# Patient Record
Sex: FEMALE | Race: WHITE | Employment: OTHER | URBAN - METROPOLITAN AREA
[De-identification: names, ages, dates, MRNs, and addresses within clinical notes are randomized per-mention and may not be internally consistent; named-entity substitution may affect disease eponyms.]

---

## 2017-02-04 ENCOUNTER — APPOINTMENT (OUTPATIENT)
Dept: GENERAL RADIOLOGY | Age: 82
DRG: 305 | End: 2017-02-04
Attending: EMERGENCY MEDICINE
Payer: MEDICARE

## 2017-02-04 ENCOUNTER — APPOINTMENT (OUTPATIENT)
Dept: CT IMAGING | Age: 82
DRG: 305 | End: 2017-02-04
Attending: EMERGENCY MEDICINE
Payer: MEDICARE

## 2017-02-04 ENCOUNTER — HOSPITAL ENCOUNTER (INPATIENT)
Age: 82
LOS: 1 days | Discharge: HOME | DRG: 305 | End: 2017-02-05
Attending: EMERGENCY MEDICINE | Admitting: FAMILY MEDICINE
Payer: MEDICARE

## 2017-02-04 DIAGNOSIS — R42 DIZZINESS: ICD-10-CM

## 2017-02-04 DIAGNOSIS — I16.0 HYPERTENSIVE URGENCY: Primary | ICD-10-CM

## 2017-02-04 LAB
ALBUMIN SERPL BCP-MCNC: 3.9 G/DL (ref 3.2–4.6)
ALBUMIN/GLOB SERPL: 1.1 {RATIO} (ref 1.2–3.5)
ALP SERPL-CCNC: 74 U/L (ref 50–136)
ALT SERPL-CCNC: 25 U/L (ref 12–65)
ANION GAP BLD CALC-SCNC: 8 MMOL/L (ref 7–16)
AST SERPL W P-5'-P-CCNC: 23 U/L (ref 15–37)
BASOPHILS # BLD AUTO: 0 K/UL (ref 0–0.2)
BASOPHILS # BLD: 0 % (ref 0–2)
BILIRUB SERPL-MCNC: 0.2 MG/DL (ref 0.2–1.1)
BUN SERPL-MCNC: 15 MG/DL (ref 8–23)
CALCIUM SERPL-MCNC: 7.7 MG/DL (ref 8.3–10.4)
CHLORIDE SERPL-SCNC: 100 MMOL/L (ref 98–107)
CO2 SERPL-SCNC: 27 MMOL/L (ref 21–32)
CREAT SERPL-MCNC: 0.81 MG/DL (ref 0.6–1)
DIFFERENTIAL METHOD BLD: ABNORMAL
EOSINOPHIL # BLD: 0.1 K/UL (ref 0–0.8)
EOSINOPHIL NFR BLD: 1 % (ref 0.5–7.8)
ERYTHROCYTE [DISTWIDTH] IN BLOOD BY AUTOMATED COUNT: 14 % (ref 11.9–14.6)
GLOBULIN SER CALC-MCNC: 3.7 G/DL (ref 2.3–3.5)
GLUCOSE SERPL-MCNC: 133 MG/DL (ref 65–100)
HCT VFR BLD AUTO: 36 % (ref 35.8–46.3)
HGB BLD-MCNC: 12.3 G/DL (ref 11.7–15.4)
IMM GRANULOCYTES # BLD: 0 K/UL (ref 0–0.5)
IMM GRANULOCYTES NFR BLD AUTO: 0.1 % (ref 0–5)
LYMPHOCYTES # BLD AUTO: 25 % (ref 13–44)
LYMPHOCYTES # BLD: 1.9 K/UL (ref 0.5–4.6)
MCH RBC QN AUTO: 28.7 PG (ref 26.1–32.9)
MCHC RBC AUTO-ENTMCNC: 34.2 G/DL (ref 31.4–35)
MCV RBC AUTO: 83.9 FL (ref 79.6–97.8)
MONOCYTES # BLD: 0.8 K/UL (ref 0.1–1.3)
MONOCYTES NFR BLD AUTO: 11 % (ref 4–12)
NEUTS SEG # BLD: 4.9 K/UL (ref 1.7–8.2)
NEUTS SEG NFR BLD AUTO: 63 % (ref 43–78)
PLATELET # BLD AUTO: 269 K/UL (ref 150–450)
PMV BLD AUTO: 8.8 FL (ref 10.8–14.1)
POTASSIUM SERPL-SCNC: 4 MMOL/L (ref 3.5–5.1)
PROT SERPL-MCNC: 7.6 G/DL (ref 6.3–8.2)
RBC # BLD AUTO: 4.29 M/UL (ref 4.05–5.25)
SODIUM SERPL-SCNC: 135 MMOL/L (ref 136–145)
TROPONIN I SERPL-MCNC: 0.02 NG/ML (ref 0.02–0.05)
WBC # BLD AUTO: 7.8 K/UL (ref 4.3–11.1)

## 2017-02-04 PROCEDURE — 93005 ELECTROCARDIOGRAM TRACING: CPT | Performed by: EMERGENCY MEDICINE

## 2017-02-04 PROCEDURE — 96374 THER/PROPH/DIAG INJ IV PUSH: CPT | Performed by: EMERGENCY MEDICINE

## 2017-02-04 PROCEDURE — 84484 ASSAY OF TROPONIN QUANT: CPT | Performed by: EMERGENCY MEDICINE

## 2017-02-04 PROCEDURE — 99285 EMERGENCY DEPT VISIT HI MDM: CPT | Performed by: EMERGENCY MEDICINE

## 2017-02-04 PROCEDURE — 74011250636 HC RX REV CODE- 250/636: Performed by: EMERGENCY MEDICINE

## 2017-02-04 PROCEDURE — 70450 CT HEAD/BRAIN W/O DYE: CPT

## 2017-02-04 PROCEDURE — 96376 TX/PRO/DX INJ SAME DRUG ADON: CPT | Performed by: EMERGENCY MEDICINE

## 2017-02-04 PROCEDURE — 85025 COMPLETE CBC W/AUTO DIFF WBC: CPT | Performed by: EMERGENCY MEDICINE

## 2017-02-04 PROCEDURE — 71020 XR CHEST PA LAT: CPT

## 2017-02-04 PROCEDURE — 80053 COMPREHEN METABOLIC PANEL: CPT | Performed by: EMERGENCY MEDICINE

## 2017-02-04 RX ORDER — HYDRALAZINE HYDROCHLORIDE 20 MG/ML
20 INJECTION INTRAMUSCULAR; INTRAVENOUS
Status: DISCONTINUED | OUTPATIENT
Start: 2017-02-04 | End: 2017-02-04

## 2017-02-04 RX ORDER — SODIUM CHLORIDE 0.9 % (FLUSH) 0.9 %
5-10 SYRINGE (ML) INJECTION EVERY 8 HOURS
Status: DISCONTINUED | OUTPATIENT
Start: 2017-02-04 | End: 2017-02-05 | Stop reason: HOSPADM

## 2017-02-04 RX ORDER — SODIUM CHLORIDE 0.9 % (FLUSH) 0.9 %
5-10 SYRINGE (ML) INJECTION AS NEEDED
Status: DISCONTINUED | OUTPATIENT
Start: 2017-02-04 | End: 2017-02-05 | Stop reason: HOSPADM

## 2017-02-04 RX ORDER — HYDRALAZINE HYDROCHLORIDE 20 MG/ML
10 INJECTION INTRAMUSCULAR; INTRAVENOUS
Status: COMPLETED | OUTPATIENT
Start: 2017-02-04 | End: 2017-02-04

## 2017-02-04 RX ADMIN — HYDRALAZINE HYDROCHLORIDE 10 MG: 20 INJECTION INTRAMUSCULAR; INTRAVENOUS at 23:29

## 2017-02-04 RX ADMIN — HYDRALAZINE HYDROCHLORIDE 10 MG: 20 INJECTION INTRAMUSCULAR; INTRAVENOUS at 21:56

## 2017-02-04 NOTE — IP AVS SNAPSHOT
Current Discharge Medication List  
  
Take these medications at their scheduled times Dose & Instructions Dispensing Information Comments Morning Noon Evening Bedtime ASPIR-81 81 mg tablet Generic drug:  aspirin delayed-release Your next dose is: Today, Tomorrow Other:  ____________ Dose:  81 mg Take 81 mg by mouth every morning. Refills:  0  
     
   
   
   
  
 chromium 200 mcg Cap Your next dose is: Today, Tomorrow Other:  ____________ Dose:  1 Tab Take 1 Tab by mouth every morning. Indications: taking PRN Refills:  0  
     
   
   
   
  
 COQ10  100-100 mg-unit Cap Generic drug:  coenzyme q10-vitamin e Your next dose is: Today, Tomorrow Other:  ____________ Dose:  1 Tab Take 1 Tab by mouth every morning. Indications: Taking 200mg Refills:  0  
     
   
   
   
  
 FISH OIL 1,200-144-216 mg Cap Generic drug:  fish oil-dha-epa Your next dose is: Today, Tomorrow Other:  ____________ Dose:  1 Tab Take 1 Tab by mouth every morning. Refills:  0  
     
   
   
   
  
 metoprolol succinate 50 mg XL tablet Commonly known as:  TOPROL XL Your next dose is: Today, Tomorrow Other:  ____________ Dose:  50 mg Take 1 Tab by mouth every morning. Indications: HYPERTENSION Quantity:  90 Tab Refills:  1 MULTI-VITAMIN PO Your next dose is: Today, Tomorrow Other:  ____________ Dose:  1 Tab Take 1 Tab by mouth every morning. Refills:  0  
     
   
   
   
  
 pravastatin 40 mg tablet Commonly known as:  PRAVACHOL Your next dose is: Today, Tomorrow Other:  ____________ Dose:  40 mg Take 1 Tab by mouth nightly. Quantity:  90 Tab Refills:  1  
     
   
   
   
  
 sertraline 50 mg tablet Commonly known as:  ZOLOFT Your next dose is: Today, Tomorrow Other:  ____________ Dose:  50 mg Take 1 Tab by mouth daily. Indications: ANXIETY WITH DEPRESSION Quantity:  90 Tab Refills:  1 VITAMIN D3 1,000 unit Cap Generic drug:  cholecalciferol Your next dose is: Today, Tomorrow Other:  ____________ Dose:  1000 Units Take 1,000 Units by mouth two (2) times a day. Refills:  0 Take these medications as needed Dose & Instructions Dispensing Information Comments Morning Noon Evening Bedtime IMODIUM A-D PO Your next dose is: Today, Tomorrow Other:  ____________ Dose:  0.5 Tab Take 0.5 Tabs by mouth as needed. Refills:  0  
     
   
   
   
  
 omeprazole 20 mg capsule Commonly known as:  PriLOSEC Your next dose is: Today, Tomorrow Other:  ____________ Dose:  20 mg Take 1 Cap by mouth daily as needed. Indications: HEARTBURN Quantity:  90 Cap Refills:  1 Take these medications as directed Dose & Instructions Dispensing Information Comments Morning Noon Evening Bedtime  
 losartan 50 mg tablet Commonly known as:  COZAAR Your next dose is: Today, Tomorrow Other:  ____________ TAKE 1 TABLET EVERY DAY FOR HYPERTENSION (CHANGE IN DOSE). Quantity:  90 Tab Refills:  3

## 2017-02-04 NOTE — IP AVS SNAPSHOT
Raven Soto 
 
 
 [address] 
[phone] Patient: Fatmata Lockhart MRN: [id number] WHY:[birth year] You are allergic to the following Allergen Reactions Claritin-D 12 Hour (Loratadine-Pseudoephedrine) Other (comments) Heartburn per pt Codeine Nausea and Vomiting Light headed and faint Erythromycin Diarrhea Nausea and vomiting Lipitor (Atorvastatin) Unknown (comments) Immunizations Administered for This Admission Name Date  
 TB Skin Test (PPD) Intradermal 2/5/2017 Recent Documentation Height Weight BMI OB Status Smoking Status 1.524 m 56.7 kg 24.41 kg/m2 Menopause Never Smoker Emergency Contacts Name Discharge Info Relation Home Work Mobile Héctor Kong  Child [2] [phone] Jero Villa  Child [2] [phone] About your hospitalization You were admitted on:  February 5, 2017 You last received care in the:  [facility] 2 SURGICAL You were discharged on:  February 5, 2017 Unit phone number:  [phone] Why you were hospitalized Your primary diagnosis was:  Hypertensive Urgency Your diagnoses also included:  Cad (Coronary Artery Disease), Carotid Artery Stenosis, S/P Ptca (Percutaneous Transluminal Coronary Angioplasty), Hyperlipidemia, Gerd (Gastroesophageal Reflux Disease) Providers Seen During Your Hospitalizations Provider Role Specialty Primary office phone Theron Osgood, MD Attending Provider Emergency Medicine [phone] Melina Machado MD Attending Provider [facility] [phone] Your Primary Care Physician (PCP) Primary Care Physician Office Phone Office Fax No Lam [phone] Follow-up Information Follow up With Details Comments Contact Info Mayi Vazquez MD   [address] [place] [address] [phone] Your Appointments Monday February 06, 2017  1:30 PM EST  
CAROTID with KAREN RAMOS [address] [facility] ([address] [place] [address]) [address]  
[phone] Monday February 13, 2017 11:45 AM EST Office Visit with Nahun Aviles MD  
[facility] ([address] [place] [address]) [address]  
[phone] Current Discharge Medication List  
  
CONTINUE these medications which have NOT CHANGED Dose & Instructions Dispensing Information Comments Morning Noon Evening Bedtime ASPIR-81 81 mg tablet Generic drug:  aspirin delayed-release Your next dose is: Today, Tomorrow Other:  _________ Dose:  81 mg Take 81 mg by mouth every morning. Refills:  0  
     
   
   
   
  
 chromium 200 mcg Cap Your next dose is: Today, Tomorrow Other:  _________ Dose:  1 Tab Take 1 Tab by mouth every morning. Indications: taking PRN Refills:  0  
     
   
   
   
  
 COQ10  100-100 mg-unit Cap Generic drug:  coenzyme q10-vitamin e Your next dose is: Today, Tomorrow Other:  _________ Dose:  1 Tab Take 1 Tab by mouth every morning. Indications: Taking 200mg Refills:  0  
     
   
   
   
  
 FISH OIL 1,200-144-216 mg Cap Generic drug:  fish oil-dha-epa Your next dose is: Today, Tomorrow Other:  _________ Dose:  1 Tab Take 1 Tab by mouth every morning. Refills:  0 IMODIUM A-D PO Your next dose is: Today, Tomorrow Other:  _________ Dose:  0.5 Tab Take 0.5 Tabs by mouth as needed. Refills:  0  
     
   
   
   
  
 losartan 50 mg tablet Commonly known as:  COZAAR Your next dose is: Today, Tomorrow Other:  _________ TAKE 1 TABLET EVERY DAY FOR HYPERTENSION (CHANGE IN DOSE). Quantity:  90 Tab Refills:  3 metoprolol succinate 50 mg XL tablet Commonly known as:  TOPROL XL Your next dose is: Today, Tomorrow Other:  _________ Dose:  50 mg Take 1 Tab by mouth every morning. Indications: HYPERTENSION Quantity:  90 Tab Refills:  1 MULTI-VITAMIN PO Your next dose is: Today, Tomorrow Other:  _________ Dose:  1 Tab Take 1 Tab by mouth every morning. Refills:  0  
     
   
   
   
  
 omeprazole 20 mg capsule Commonly known as:  PriLOSEC Your next dose is: Today, Tomorrow Other:  _________ Dose:  20 mg Take 1 Cap by mouth daily as needed. Indications: HEARTBURN Quantity:  90 Cap Refills:  1  
     
   
   
   
  
 pravastatin 40 mg tablet Commonly known as:  PRAVACHOL Your next dose is: Today, Tomorrow Other:  _________ Dose:  40 mg Take 1 Tab by mouth nightly. Quantity:  90 Tab Refills:  1  
     
   
   
   
  
 sertraline 50 mg tablet Commonly known as:  ZOLOFT Your next dose is: Today, Tomorrow Other:  _________ Dose:  50 mg Take 1 Tab by mouth daily. Indications: ANXIETY WITH DEPRESSION Quantity:  90 Tab Refills:  1 VITAMIN D3 1,000 unit Cap Generic drug:  cholecalciferol Your next dose is: Today, Tomorrow Other:  _________ Dose:  1000 Units Take 1,000 Units by mouth two (2) times a day. Refills:  0 Discharge Instructions DISCHARGE SUMMARY from Nurse The following personal items are in your possession at time of discharge: 
 
  
Visual Aid: Glasses, With patient Jewelry: Ring Other Valuables: Cell Phone PATIENT INSTRUCTIONS: 
 
After general anesthesia or intravenous sedation, for 24 hours or while taking prescription Narcotics: · Limit your activities · Do not drive and operate hazardous machinery · Do not make important personal or business decisions · Do  not drink alcoholic beverages · If you have not urinated within 8 hours after discharge, please contact your surgeon on call. Report the following to your surgeon: 
· Excessive pain, swelling, redness or odor of or around the surgical area · Temperature over 100.5 · Nausea and vomiting lasting longer than 4 hours or if unable to take medications · Any signs of decreased circulation or nerve impairment to extremity: change in color, persistent  numbness, tingling, coldness or increase pain · Any questions What to do at Home: 
Recommended activity: Activity as tolerated,. Please follow up with your primary doctor so that your blood pressure can be monitored. . 
 
 
*  Please give a list of your current medications to your Primary Care Provider. *  Please update this list whenever your medications are discontinued, doses are 
    changed, or new medications (including over-the-counter products) are added. *  Please carry medication information at all times in case of emergency situations. These are general instructions for a healthy lifestyle: No smoking/ No tobacco products/ Avoid exposure to second hand smoke Surgeon General's Warning:  Quitting smoking now greatly reduces serious risk to your health. Obesity, smoking, and sedentary lifestyle greatly increases your risk for illness A healthy diet, regular physical exercise & weight monitoring are important for maintaining a healthy lifestyle You may be retaining fluid if you have a history of heart failure or if you experience any of the following symptoms:  Weight gain of 3 pounds or more overnight or 5 pounds in a week, increased swelling in our hands or feet or shortness of breath while lying flat in bed. Please call your doctor as soon as you notice any of these symptoms; do not wait until your next office visit. Recognize signs and symptoms of STROKE: 
 
F-face looks uneven A-arms unable to move or move unevenly S-speech slurred or non-existent T-time-call 911 as soon as signs and symptoms begin-DO NOT go Back to bed or wait to see if you get better-TIME IS BRAIN. Warning Signs of HEART ATTACK Call 911 if you have these symptoms: 
? Chest discomfort. Most heart attacks involve discomfort in the center of the chest that lasts more than a few minutes, or that goes away and comes back. It can feel like uncomfortable pressure, squeezing, fullness, or pain. ? Discomfort in other areas of the upper body. Symptoms can include pain or discomfort in one or both arms, the back, neck, jaw, or stomach. ? Shortness of breath with or without chest discomfort. ? Other signs may include breaking out in a cold sweat, nausea, or lightheadedness. Don't wait more than five minutes to call [address]! Fast action can save your life. Calling 911 is almost always the fastest way to get lifesaving treatment. Emergency Medical Services staff can begin treatment when they arrive  up to an hour sooner than if someone gets to the hospital by car. The discharge information has been reviewed with the patient. The patient verbalized understanding. Discharge medications reviewed with the patient and appropriate educational materials and side effects teaching were provided. High Blood Pressure: Care Instructions Your Care Instructions If your blood pressure is usually above 140/90, you have high blood pressure, or hypertension. That means the top number is 140 or higher or the bottom number is 90 or higher, or both. Despite what a lot of people think, high blood pressure usually doesn't cause headaches or make you feel dizzy or lightheaded. It usually has no symptoms.  But it does increase your risk for heart attack, stroke, and kidney or eye damage. The higher your blood pressure, the more your risk increases. Your doctor will give you a goal for your blood pressure. Your goal will be based on your health and your age. An example of a goal is to keep your blood pressure below 140/90. Lifestyle changes, such as eating healthy and being active, are always important to help lower blood pressure. You might also take medicine to reach your blood pressure goal. 
Follow-up care is a key part of your treatment and safety. Be sure to make and go to all appointments, and call your doctor if you are having problems. It's also a good idea to know your test results and keep a list of the medicines you take. How can you care for yourself at home? Medical treatment · If you stop taking your medicine, your blood pressure will go back up. You may take one or more types of medicine to lower your blood pressure. Be safe with medicines. Take your medicine exactly as prescribed. Call your doctor if you think you are having a problem with your medicine. · Talk to your doctor before you start taking aspirin every day. Aspirin can help certain people lower their risk of a heart attack or stroke. But taking aspirin isn't right for everyone, because it can cause serious bleeding. · See your doctor regularly. You may need to see the doctor more often at first or until your blood pressure comes down. · If you are taking blood pressure medicine, talk to your doctor before you take decongestants or anti-inflammatory medicine, such as ibuprofen. Some of these medicines can raise blood pressure. · Learn how to check your blood pressure at home. Lifestyle changes · Stay at a healthy weight. This is especially important if you put on weight around the waist. Losing even 10 pounds can help you lower your blood pressure. · If your doctor recommends it, get more exercise. Walking is a good choice. Bit by bit, increase the amount you walk every day.  Try for at least 30 minutes on most days of the week. You also may want to swim, bike, or do other activities. · Avoid or limit alcohol. Talk to your doctor about whether you can drink any alcohol. · Try to limit how much sodium you eat to less than 2,300 milligrams (mg) a day. Your doctor may ask you to try to eat less than 1,500 mg a day. · Eat plenty of fruits (such as bananas and oranges), vegetables, legumes, whole grains, and low-fat dairy products. · Lower the amount of saturated fat in your diet. Saturated fat is found in animal products such as milk, cheese, and meat. Limiting these foods may help you lose weight and also lower your risk for heart disease. · Do not smoke. Smoking increases your risk for heart attack and stroke. If you need help quitting, talk to your doctor about stop-smoking programs and medicines. These can increase your chances of quitting for good. When should you call for help? Call 911 anytime you think you may need emergency care. This may mean having symptoms that suggest that your blood pressure is causing a serious heart or blood vessel problem. Your blood pressure may be over 180/110. For example, call 911 if: 
· You have symptoms of a heart attack. These may include: ¨ Chest pain or pressure, or a strange feeling in the chest. 
¨ Sweating. ¨ Shortness of breath. ¨ Nausea or vomiting. ¨ Pain, pressure, or a strange feeling in the back, neck, jaw, or upper belly or in one or both shoulders or arms. ¨ Lightheadedness or sudden weakness. ¨ A fast or irregular heartbeat. · You have symptoms of a stroke. These may include: 
¨ Sudden numbness, tingling, weakness, or loss of movement in your face, arm, or leg, especially on only one side of your body. ¨ Sudden vision changes. ¨ Sudden trouble speaking. ¨ Sudden confusion or trouble understanding simple statements. ¨ Sudden problems with walking or balance. ¨ A sudden, severe headache that is different from past headaches. · You have severe back or belly pain. Do not wait until your blood pressure comes down on its own. Get help right away. Call your doctor now or seek immediate care if: 
· Your blood pressure is much higher than normal (such as 180/110 or higher), but you don't have symptoms. · You think high blood pressure is causing symptoms, such as: ¨ Severe headache. ¨ Blurry vision. Watch closely for changes in your health, and be sure to contact your doctor if: 
· Your blood pressure measures 140/90 or higher at least 2 times. That means the top number is 140 or higher or the bottom number is 90 or higher, or both. · You think you may be having side effects from your blood pressure medicine. · Your blood pressure is usually normal, but it goes above normal at least 2 times. Where can you learn more? Go to http://maria c-boubacar.info/. Enter H222 in the search box to learn more about \"High Blood Pressure: Care Instructions. \" Current as of: August 8, 2016 Content Version: 11.1 © 4333-4074 [employer]. Care instructions adapted under license by [employer] (which disclaims liability or warranty for this information). If you have questions about a medical condition or this instruction, always ask your healthcare professional. [address] any warranty or liability for your use of this information. Discharge Orders None [employer] Announcement We are excited to announce that we are making your provider's discharge notes available to you in [employer]. You will see these notes when they are completed and signed by the physician that discharged you from your recent hospital stay. If you have any questions or concerns about any information you see in [employer], please call the Health Information Department where you were seen or reach out to your Primary Care Provider for more information about your plan of care. Introducing [facility] SERVICES! Liz Bahena introduces [employer] patient portal. Now you can access parts of your medical record, email your doctor's office, and request medication refills online. 1. In your internet browser, go to [email or web address] 2. Click on the First Time User? Click Here link in the Sign In box. You will see the New Member Sign Up page. 3. Enter your [employer] Access Code exactly as it appears below. You will not need to use this code after youve completed the sign-up process. If you do not sign up before the expiration date, you must request a new code. · [employer] Access Code: [email or web address] Expires: 5/3/2017  1:53 PM 
 
4. Enter the last four digits of your Social Security Number (xxxx) and Date of Birth (mm/dd/yyyy) as indicated and click Submit. You will be taken to the next sign-up page. 5. Create a [employer] ID. This will be your [employer] login ID and cannot be changed, so think of one that is secure and easy to remember. 6. Create a [employer] password. You can change your password at any time. 7. Enter your Password Reset Question and Answer. This can be used at a later time if you forget your password. 8. Enter your e-mail address. You will receive e-mail notification when new information is available in [address] 9. Click Sign Up. You can now view and download portions of your medical record. 10. Click the Download Summary menu link to download a portable copy of your medical information. If you have questions, please visit the Frequently Asked Questions section of the [employer] website. Remember, [employer] is NOT to be used for urgent needs. For medical emergencies, dial 911. Now available from your iPhone and Android! General Information Please provide this summary of care documentation to your next provider. Patient Signature:  ____________________________________________________________ Date:  ____________________________________________________________  
  
Braulio Sport Provider Signature:  ____________________________________________________________ Date:  ____________________________________________________________

## 2017-02-05 ENCOUNTER — APPOINTMENT (OUTPATIENT)
Dept: ULTRASOUND IMAGING | Age: 82
DRG: 305 | End: 2017-02-05
Payer: MEDICARE

## 2017-02-05 ENCOUNTER — APPOINTMENT (OUTPATIENT)
Dept: MRI IMAGING | Age: 82
DRG: 305 | End: 2017-02-05
Payer: MEDICARE

## 2017-02-05 VITALS
WEIGHT: 125 LBS | DIASTOLIC BLOOD PRESSURE: 78 MMHG | TEMPERATURE: 98.5 F | OXYGEN SATURATION: 95 % | BODY MASS INDEX: 24.54 KG/M2 | RESPIRATION RATE: 18 BRPM | SYSTOLIC BLOOD PRESSURE: 157 MMHG | HEIGHT: 60 IN | HEART RATE: 73 BPM

## 2017-02-05 PROBLEM — I16.0 HYPERTENSIVE URGENCY: Status: ACTIVE | Noted: 2017-02-05

## 2017-02-05 LAB
APPEARANCE UR: ABNORMAL
ATRIAL RATE: 74 BPM
BACTERIA URNS QL MICRO: ABNORMAL /HPF
BILIRUB UR QL: NEGATIVE
CALCULATED P AXIS, ECG09: -14 DEGREES
CALCULATED R AXIS, ECG10: 23 DEGREES
CALCULATED T AXIS, ECG11: 9 DEGREES
CASTS URNS QL MICRO: ABNORMAL /LPF
CHOLEST SERPL-MCNC: 192 MG/DL
COLOR UR: YELLOW
DIAGNOSIS, 93000: NORMAL
DIASTOLIC BP, ECG02: NORMAL MMHG
EPI CELLS #/AREA URNS HPF: ABNORMAL /HPF
GLUCOSE UR STRIP.AUTO-MCNC: NEGATIVE MG/DL
HDLC SERPL-MCNC: 88 MG/DL (ref 40–60)
HDLC SERPL: 2.2 {RATIO}
HGB UR QL STRIP: ABNORMAL
KETONES UR QL STRIP.AUTO: NEGATIVE MG/DL
LDLC SERPL CALC-MCNC: 97.8 MG/DL
LEUKOCYTE ESTERASE UR QL STRIP.AUTO: ABNORMAL
LIPID PROFILE,FLP: ABNORMAL
NITRITE UR QL STRIP.AUTO: NEGATIVE
P-R INTERVAL, ECG05: 226 MS
PH UR STRIP: 7.5 [PH] (ref 5–9)
PROT UR STRIP-MCNC: NEGATIVE MG/DL
Q-T INTERVAL, ECG07: 400 MS
QRS DURATION, ECG06: 78 MS
QTC CALCULATION (BEZET), ECG08: 444 MS
RBC #/AREA URNS HPF: ABNORMAL /HPF
SP GR UR REFRACTOMETRY: 1.01 (ref 1–1.02)
SYSTOLIC BP, ECG01: NORMAL MMHG
TRIGL SERPL-MCNC: 31 MG/DL (ref 35–150)
TROPONIN I SERPL-MCNC: 0.07 NG/ML (ref 0.02–0.05)
TROPONIN I SERPL-MCNC: 0.08 NG/ML (ref 0.02–0.05)
TSH SERPL DL<=0.005 MIU/L-ACNC: 1.42 UIU/ML (ref 0.36–3.74)
UROBILINOGEN UR QL STRIP.AUTO: 0.2 EU/DL (ref 0.2–1)
VENTRICULAR RATE, ECG03: 74 BPM
VLDLC SERPL CALC-MCNC: 6.2 MG/DL (ref 6–23)
WBC URNS QL MICRO: ABNORMAL /HPF

## 2017-02-05 PROCEDURE — 74011000302 HC RX REV CODE- 302: Performed by: NURSE PRACTITIONER

## 2017-02-05 PROCEDURE — 84484 ASSAY OF TROPONIN QUANT: CPT | Performed by: NURSE PRACTITIONER

## 2017-02-05 PROCEDURE — 65660000000 HC RM CCU STEPDOWN

## 2017-02-05 PROCEDURE — 70551 MRI BRAIN STEM W/O DYE: CPT

## 2017-02-05 PROCEDURE — 97161 PT EVAL LOW COMPLEX 20 MIN: CPT

## 2017-02-05 PROCEDURE — 94760 N-INVAS EAR/PLS OXIMETRY 1: CPT

## 2017-02-05 PROCEDURE — 74011250637 HC RX REV CODE- 250/637: Performed by: NURSE PRACTITIONER

## 2017-02-05 PROCEDURE — 80061 LIPID PANEL: CPT | Performed by: NURSE PRACTITIONER

## 2017-02-05 PROCEDURE — 81001 URINALYSIS AUTO W/SCOPE: CPT | Performed by: NURSE PRACTITIONER

## 2017-02-05 PROCEDURE — 36415 COLL VENOUS BLD VENIPUNCTURE: CPT | Performed by: NURSE PRACTITIONER

## 2017-02-05 PROCEDURE — 84443 ASSAY THYROID STIM HORMONE: CPT | Performed by: NURSE PRACTITIONER

## 2017-02-05 PROCEDURE — 93880 EXTRACRANIAL BILAT STUDY: CPT

## 2017-02-05 PROCEDURE — 86580 TB INTRADERMAL TEST: CPT | Performed by: NURSE PRACTITIONER

## 2017-02-05 RX ORDER — HYDRALAZINE HYDROCHLORIDE 20 MG/ML
10 INJECTION INTRAMUSCULAR; INTRAVENOUS
Status: DISCONTINUED | OUTPATIENT
Start: 2017-02-05 | End: 2017-02-05 | Stop reason: HOSPADM

## 2017-02-05 RX ORDER — SODIUM CHLORIDE 0.9 % (FLUSH) 0.9 %
5-10 SYRINGE (ML) INJECTION AS NEEDED
Status: DISCONTINUED | OUTPATIENT
Start: 2017-02-05 | End: 2017-02-05 | Stop reason: HOSPADM

## 2017-02-05 RX ORDER — SODIUM CHLORIDE 0.9 % (FLUSH) 0.9 %
5-10 SYRINGE (ML) INJECTION EVERY 8 HOURS
Status: DISCONTINUED | OUTPATIENT
Start: 2017-02-05 | End: 2017-02-05 | Stop reason: HOSPADM

## 2017-02-05 RX ORDER — METOPROLOL SUCCINATE 50 MG/1
50 TABLET, EXTENDED RELEASE ORAL
Status: DISCONTINUED | OUTPATIENT
Start: 2017-02-05 | End: 2017-02-05 | Stop reason: HOSPADM

## 2017-02-05 RX ORDER — ENOXAPARIN SODIUM 100 MG/ML
40 INJECTION SUBCUTANEOUS EVERY 24 HOURS
Status: DISCONTINUED | OUTPATIENT
Start: 2017-02-05 | End: 2017-02-05 | Stop reason: HOSPADM

## 2017-02-05 RX ORDER — SERTRALINE HYDROCHLORIDE 50 MG/1
50 TABLET, FILM COATED ORAL DAILY
Status: DISCONTINUED | OUTPATIENT
Start: 2017-02-05 | End: 2017-02-05 | Stop reason: HOSPADM

## 2017-02-05 RX ORDER — ENALAPRILAT 1.25 MG/ML
0.62 INJECTION INTRAVENOUS
Status: DISCONTINUED | OUTPATIENT
Start: 2017-02-05 | End: 2017-02-05 | Stop reason: HOSPADM

## 2017-02-05 RX ORDER — ASPIRIN 81 MG/1
81 TABLET ORAL
Status: DISCONTINUED | OUTPATIENT
Start: 2017-02-05 | End: 2017-02-05 | Stop reason: HOSPADM

## 2017-02-05 RX ORDER — ACETAMINOPHEN 325 MG/1
650 TABLET ORAL
Status: DISCONTINUED | OUTPATIENT
Start: 2017-02-05 | End: 2017-02-05 | Stop reason: HOSPADM

## 2017-02-05 RX ORDER — GLUCOSAMINE SULFATE 1500 MG
1000 POWDER IN PACKET (EA) ORAL 2 TIMES DAILY
COMMUNITY

## 2017-02-05 RX ORDER — LOSARTAN POTASSIUM 50 MG/1
50 TABLET ORAL DAILY
Status: DISCONTINUED | OUTPATIENT
Start: 2017-02-05 | End: 2017-02-05 | Stop reason: HOSPADM

## 2017-02-05 RX ORDER — PANTOPRAZOLE SODIUM 40 MG/1
40 TABLET, DELAYED RELEASE ORAL
Status: DISCONTINUED | OUTPATIENT
Start: 2017-02-05 | End: 2017-02-05 | Stop reason: HOSPADM

## 2017-02-05 RX ADMIN — SERTRALINE HYDROCHLORIDE 50 MG: 50 TABLET ORAL at 10:08

## 2017-02-05 RX ADMIN — Medication 10 ML: at 06:41

## 2017-02-05 RX ADMIN — LOSARTAN POTASSIUM 50 MG: 50 TABLET ORAL at 10:08

## 2017-02-05 RX ADMIN — TUBERCULIN PURIFIED PROTEIN DERIVATIVE 5 UNITS: 5 INJECTION INTRADERMAL at 06:35

## 2017-02-05 RX ADMIN — ASPIRIN 81 MG: 81 TABLET, COATED ORAL at 06:31

## 2017-02-05 RX ADMIN — METOPROLOL SUCCINATE 50 MG: 50 TABLET, EXTENDED RELEASE ORAL at 06:30

## 2017-02-05 NOTE — PROGRESS NOTES
Primary Nurse Debbie Renae RN and Bev Burr RN performed a dual skin assessment on this patient. Skin warm, dry and intact.

## 2017-02-05 NOTE — PROGRESS NOTES
TRANSFER - IN REPORT:    Verbal report received from [facility] on Faheem Marcial  being received from ED for routine progression of care      Report consisted of patients Situation, Background, Assessment and   Recommendations(SBAR). Information from the following report(s) SBAR, ED Summary, MAR and Med Rec Status was reviewed with the receiving nurse. Opportunity for questions and clarification was provided. Assessment completed upon patients arrival to unit and care assumed.

## 2017-02-05 NOTE — PROGRESS NOTES
END OF SHIFT NOTE:    INTAKE/OUTPUT     Voiding: YES  Catheter: NO  Drain:              Flatus: Patient does have flatus present. Stool:  0 occurrences. Characteristics:       Emesis: 0 occurrences. Characteristics:        VITAL SIGNS  Patient Vitals for the past 12 hrs:   Temp Pulse Resp BP SpO2   02/05/17 0400 97.7 °F (36.5 °C) 86 18 148/68 99 %   02/05/17 0209 - - - - 98 %   02/05/17 0141 97.5 °F (36.4 °C) 89 20 134/57 98 %   02/05/17 0045 - - - 141/63 -   02/05/17 0044 - 86 30 - 95 %   02/05/17 0037 - 93 (!) 45 - -   02/05/17 0036 - - - 145/64 -   02/05/17 0015 - 92 - 161/70 95 %   02/05/17 0000 - 96 15 187/81 95 %   02/04/17 2345 - 89 13 191/56 97 %   02/04/17 2330 - - - (!) 202/86 -   02/04/17 2329 - 81 19 - 97 %   02/04/17 2322 - (!) 108 (!) 33 - -   02/04/17 2321 - - - (!) 211/79 -   02/04/17 2300 - 81 13 170/75 96 %   02/04/17 2245 - 78 18 175/78 97 %   02/04/17 2233 - 82 - 181/78 97 %   02/04/17 2232 - - - 198/82 -   02/04/17 2200 - 67 18 (!) 200/92 97 %   02/04/17 2144 - 66 - - 97 %   02/04/17 2143 - - - (!) 200/92 -   02/04/17 2141 - - - (!) 201/113 -   02/04/17 2031 97.9 °F (36.6 °C) 86 20 (!) 228/84 97 %       Pain Assessment  Pain Intensity 1: 0 (02/04/17 2031)        Patient Stated Pain Goal: 0    Ambulating  YES    Shift report will be given to oncoming nurse at the bedside.     Sushila Martinez RN

## 2017-02-05 NOTE — H&P
Hospitalist Admission History and Physical       Hx: 79 yo female with elevated BP and ataxia. Exam: ataxic gait; o/w unremarkable  A/P: BP control; neuro w/u;    Pt was seen and examined with the NP/PA; all orders and documents reviewed and discussed. Any changes noted below. Signed By: Becky Ball MD     February 5, 2017           Chief Complaint   Patient presents with    Hypertension       Subjective:   Patient is a pleasant, alert, oriented  80 y.o.  female who presented to ER this pm with complaints of blood pressure elevation. She took her blood pressure after she came inside after doing yard work all day feeling \"unbalanced. \"  She denies overt dizziness, vertigo, chest pain, shortness of breath, recent illness or exposure to same. She is compliant with all of her meds, has history of cardiac stent some 8 years ago. She rarely is ill, and has very rare ER visits. She is unable to give any further information, states she feels as if she will stumble. I observed her ambulating to the bathroom with assist, and she did seem off kilter, first leaning to the right, then overcorrecting. She also notes she took 2 ASA 81 mg prior to arrival.  History includes IBS, denies any recent diarrhea or any GI symptoms. Additional history as noted below.      Past Medical History   Diagnosis Date    Arrhythmia      pvc's controlled by medication    Arthritis      osteo    CAD (coronary artery disease) 2007     cardiac stent x1    GERD (gastroesophageal reflux disease)      mostly controlled by medication    Hypercholesteraemia      controlled by medications    Hypertension 2004     controlled by medication    IBS (irritable bowel syndrome)     Insomnia     Liver disease      yellow jaundice at age 15 no problems since    Psychiatric disorder      anxiety no treatment at present    Seasonal allergies         Past Surgical History   Procedure Laterality Date    Vascular surgery procedure unlist  2003     laser treatment for vericose veins    Hx cataract removal  1999     brien wtih IOlens implants    Hx gyn       9 natural child births!  Hx orthopaedic  2005     brien carpal tunel    Pr cardiac surg procedure unlist  2007     cardiac stent x1        Family History   Problem Relation Age of Onset    Hypertension Mother     Stroke Mother     Heart Disease Father     Heart Disease Sister     Hypertension Sister     Heart Disease Sister     Hypertension Sister        Social History     Social History Narrative    2/5/17:  PATIENT IS , LIVES ALONE. Social History   Substance Use Topics    Smoking status: Never Smoker    Smokeless tobacco: Never Used    Alcohol use No        History   Drug Use    Yes    Special: Prescription, OTC     Allergies   Allergen Reactions    Claritin-D 12 Hour [Loratadine-Pseudoephedrine] Other (comments)     Heartburn per pt    Codeine Nausea and Vomiting     Light headed and faint    Erythromycin Diarrhea     Nausea and vomiting    Lipitor [Atorvastatin] Unknown (comments)       Prior to Admission medications    Medication Sig Start Date End Date Taking? Authorizing Provider   cholecalciferol (VITAMIN D3) 1,000 unit cap Take 1,000 Units by mouth two (2) times a day. Yes Historical Provider   losartan (COZAAR) 50 mg tablet TAKE 1 TABLET EVERY DAY FOR HYPERTENSION (CHANGE IN DOSE). 11/23/16  Yes Mireya Brown MD   sertraline (ZOLOFT) 50 mg tablet Take 1 Tab by mouth daily. Indications: ANXIETY WITH DEPRESSION 10/15/15  Yes Jocelin Sanchez MD   metoprolol succinate (TOPROL XL) 50 mg XL tablet Take 1 Tab by mouth every morning. Indications: HYPERTENSION 10/15/15  Yes Jocelin Sanchez MD   omeprazole (PRILOSEC) 20 mg capsule Take 1 Cap by mouth daily as needed. Indications: HEARTBURN 10/15/15  Yes Jocelin Sanchez MD   pravastatin (PRAVACHOL) 40 mg tablet Take 1 Tab by mouth nightly. 10/15/15  Yes Jocelin Sanchez MD MULTIVITAMINS (MULTI-VITAMIN PO) Take 1 Tab by mouth every morning. 7/6/10  Yes Historical Provider   aspirin delayed-release (ASPIR-81) 81 mg tablet Take 81 mg by mouth every morning. 7/6/10  Yes Historical Provider   fish oil-dha-epa (FISH OIL) 1,200-144-216 mg Cap Take 1 Tab by mouth every morning. 7/6/10  Yes Historical Provider   coenzyme q10-vitamin e (COQ10 ) 100-100 mg-unit Cap Take 1 Tab by mouth every morning. Indications: Taking 200mg 7/6/10  Yes Historical Provider   chromium 200 mcg Cap Take 1 Tab by mouth every morning. Indications: taking PRN 7/6/10  Yes Historical Provider   LOPERAMIDE HCL (IMODIUM A-D PO) Take 0.5 Tabs by mouth as needed. 7/6/10  Yes Historical Provider     PHP:  Thuy Butt MD    Review of Systems    A comprehensive review of systems was negative except for that written in the HPI. Objective:     Visit Vitals    /63    Pulse 86    Temp 97.9 °F (36.6 °C)    Resp 30    Ht 5' (1.524 m)    Wt 56.7 kg (125 lb)    SpO2 95%    BMI 24.41 kg/m2      Data Review:   Recent Results (from the past 24 hour(s))   CBC WITH AUTOMATED DIFF    Collection Time: 02/04/17  8:45 PM   Result Value Ref Range    WBC 7.8 4.3 - 11.1 K/uL    RBC 4.29 4.05 - 5.25 M/uL    HGB 12.3 11.7 - 15.4 g/dL    HCT 36.0 35.8 - 46.3 %    MCV 83.9 79.6 - 97.8 FL    MCH 28.7 26.1 - 32.9 PG    MCHC 34.2 31.4 - 35.0 g/dL    RDW 14.0 11.9 - 14.6 %    PLATELET 110 870 - 894 K/uL    MPV 8.8 (L) 10.8 - 14.1 FL    DF AUTOMATED      NEUTROPHILS 63 43 - 78 %    LYMPHOCYTES 25 13 - 44 %    MONOCYTES 11 4.0 - 12.0 %    EOSINOPHILS 1 0.5 - 7.8 %    BASOPHILS 0 0.0 - 2.0 %    IMMATURE GRANULOCYTES 0.1 0.0 - 5.0 %    ABS. NEUTROPHILS 4.9 1.7 - 8.2 K/UL    ABS. LYMPHOCYTES 1.9 0.5 - 4.6 K/UL    ABS. MONOCYTES 0.8 0.1 - 1.3 K/UL    ABS. EOSINOPHILS 0.1 0.0 - 0.8 K/UL    ABS. BASOPHILS 0.0 0.0 - 0.2 K/UL    ABS. IMM.  GRANS. 0.0 0.0 - 0.5 K/UL   METABOLIC PANEL, COMPREHENSIVE    Collection Time: 02/04/17  8:45 PM Result Value Ref Range    Sodium 135 (L) 136 - 145 mmol/L    Potassium 4.0 3.5 - 5.1 mmol/L    Chloride 100 98 - 107 mmol/L    CO2 27 21 - 32 mmol/L    Anion gap 8 7 - 16 mmol/L    Glucose 133 (H) 65 - 100 mg/dL    BUN 15 8 - 23 MG/DL    Creatinine 0.81 0.6 - 1.0 MG/DL    GFR est AA >60 >60 ml/min/1.73m2    GFR est non-AA >60 >60 ml/min/1.73m2    Calcium 7.7 (L) 8.3 - 10.4 MG/DL    Bilirubin, total 0.2 0.2 - 1.1 MG/DL    ALT (SGPT) 25 12 - 65 U/L    AST (SGOT) 23 15 - 37 U/L    Alk. phosphatase 74 50 - 136 U/L    Protein, total 7.6 6.3 - 8.2 g/dL    Albumin 3.9 3.2 - 4.6 g/dL    Globulin 3.7 (H) 2.3 - 3.5 g/dL    A-G Ratio 1.1 (L) 1.2 - 3.5     TROPONIN I    Collection Time: 02/04/17  8:45 PM   Result Value Ref Range    Troponin-I, Qt. 0.02 0.02 - 0.05 NG/ML   CXR:  There is no focal pulmonary consolidation. No pleural effusion, pneumothorax or evidence of pulmonary edema. The cardiomediastinal contour is within normal limits. The surrounding bones are intact. IMPRESSION:  No acute pulmonary disease. CT HEAD:  No acute intracranial abnormality. No mass effect    PHYSICAL EXAM:  General appearance: oriented and alert, cooperative, denies any symptoms other than ataxia. Well nourished, well hydrated. Appears much younger than stated age. Very well groomed  Head: Normocephalic, without obvious abnormality, atraumatic  Throat: Lips, mucosa, and tongue normal. Teeth and gums normal    Neck: supple, symmetrical, trachea midline, no adenopathy, thyroid:   not enlarged, symmetric, no tenderness/mass/nodules, no carotid   bruit and no JVD  Lungs: clear to auscultation bilaterally  Heart: regular rate and rhythm, S1, S2 normal, no murmur, click, rub or gallop  Abdomen: soft, non-tender. Bowel sounds normal. No masses,  no organomegaly  Extremities: All extremities normal, atraumatic, no cyanosis or edema.   Skin: Skin color, texture, turgor normal. No rashes or lesions  Neurologic: Grossly normal, no unilateral weakness, however gait is erratic and stumbling.      Assessment:   Hypertensive urgency   Off balance  Carotid artery stenosis:  s/p left CEA, worsening Right ICA disease by duplex 11/2012  CAD    Hyperlipidemia  GERD   S/P PTCA/STENT     Plan:   Admit to remote telemetry for Dr Paz Nolan  Full code  lovenox for DVT prophy  Home meds reviewed and reconciled by me  Apradia prn   PT, OT consults  SW consult for discharge planning and possible need for rehab  MRI brain in am  Urinalysis  Carotid dopplers in am

## 2017-02-05 NOTE — PROGRESS NOTES
Problem: Mobility Impaired (Adult and Pediatric)  Goal: *Acute Goals and Plan of Care (Insert Text)  No goals indicated.  ________________________________________________________________________________________________      PHYSICAL THERAPY: INITIAL ASSESSMENT, DISCHARGE 2/5/2017  INPATIENT: Hospital Day: 2  Payor: [employer] / Plan: [address] HMO / Product Type: Managed Care Medicare /      NAME/AGE/GENDER: Marvin Collado is a 80 y.o. female        PRIMARY DIAGNOSIS: Hypertensive urgency Hypertensive urgency Hypertensive urgency        ICD-10: Treatment Diagnosis:       · Difficulty in walking, Not elsewhere classified (R26.2)   Precaution/Allergies:  Claritin-d 12 hour [loratadine-pseudoephedrine]; Codeine; Erythromycin; and Lipitor [atorvastatin]       ASSESSMENT:      Ms. Dania Jones presents sitting on edge of bed, pleasant, agreeable to treatment. Pt admitted for hypertensive urgency, states that she is feeling \"much better\" now. Pt lives alone with 9 children scattered all over the country but willing to help as able. Pt also with good neighbors and [employer] family that are happy to assist. Pt has been independent with all functional mobility and gait without assistive device. No recent falls. Pt now able to demonstrate independent bed mobility, transfers and gait 500' without assistive device. Pt with no deficits in strength, coordination, balance. Pt remains very high functioning and is at her baseline. Pt has no further skilled PT needs and will be discharged from PT. This section established at most recent assessment   PROBLEM LIST (Impairments causing functional limitations):   1. none    INTERVENTIONS PLANNED: (Benefits and precautions of physical therapy have been discussed with the patient.)  1. none      TREATMENT PLAN: Frequency/Duration: discharge PT  Rehabilitation Potential For Stated Goals: NO GOALS ESTABLISHED      RECOMMENDED REHABILITATION/EQUIPMENT: (at time of discharge pending progress): None. HISTORY:   History of Present Injury/Illness (Reason for Referral):  Per chart: Patient is a pleasant, alert, oriented 80 y.o.  female who presented to ER this pm with complaints of blood pressure elevation. She took her blood pressure after she came inside after doing yard work all day feeling \"unbalanced. \" She denies overt dizziness, vertigo, chest pain, shortness of breath, recent illness or exposure to same. She is compliant with all of her meds, has history of cardiac stent some 8 years ago. She rarely is ill, and has very rare ER visits. She is unable to give any further information, states she feels as if she will stumble. I observed her ambulating to the bathroom with assist, and she did seem off kilter, first leaning to the right, then overcorrecting. She also notes she took 2 ASA 81 mg prior to arrival. History includes IBS, denies any recent diarrhea or any GI symptoms. Past Medical History/Comorbidities:   Ms. Bisi Mchugh  has a past medical history of Arrhythmia; Arthritis; CAD (coronary artery disease) (2007); GERD (gastroesophageal reflux disease); Hypercholesteraemia; Hypertension (2004); IBS (irritable bowel syndrome); Insomnia; Liver disease; Psychiatric disorder; and Seasonal allergies. Ms. Bisi Mchugh  has a past surgical history that includes vascular surgery procedure unlist (2003); cataract removal (1999); gyn; orthopaedic (2005); and cardiac surg procedure unlist (2007).   Social History/Living Environment:   Home Environment: Private residence  # Steps to Enter: 0  Rails to Enter: No  Wheelchair Ramp: No  One/Two Story Residence: One story  Living Alone: Yes  Support Systems: Child(melany), [employer]ational / kali community, Friends \ neighbors  Patient Expects to be Discharged to[de-identified] Private residence  Current DME Used/Available at Home:  (has a rolling walker and straight cane but does not use)  Tub or Shower Type: Shower  Prior Level of Function/Work/Activity:  Pt was independent with all functional mobility and gait. Drives. One fall within the past year. Number of Personal Factors/Comorbidities that affect the Plan of Care: 1-2: MODERATE COMPLEXITY   EXAMINATION:   Most Recent Physical Functioning:   Gross Assessment:  AROM: Within functional limits  Strength: Within functional limits  Sensation: Intact               Posture:  Posture (WDL): Within defined limits  Balance:  Sitting: Intact  Standing: Intact Bed Mobility:  Rolling: Independent  Supine to Sit: Independent  Sit to Supine: Independent  Scooting: Independent  Wheelchair Mobility:     Transfers:  Sit to Stand: Independent  Stand to Sit: Independent  Bed to Chair: Independent  Gait:     Distance (ft): 500 Feet (ft)  Assistive Device:  (none)  Ambulation - Level of Assistance: Independent       Body Structures Involved:  1. Heart Body Functions Affected:  1. Cardio Activities and Participation Affected:  1. None   Number of elements that affect the Plan of Care: 1-2: LOW COMPLEXITY   CLINICAL PRESENTATION:   Presentation: Stable and uncomplicated: LOW COMPLEXITY   CLINICAL DECISION MAKIN [place] [id number] AM-PAC 6 Clicks   Basic Mobility Inpatient Short Form  How much difficulty does the patient currently have. .. Unable A Lot A Little None   1. Turning over in bed (including adjusting bedclothes, sheets and blankets)? [ ] 1   [ ] 2   [ ] 3   [X] 4   2. Sitting down on and standing up from a chair with arms ( e.g., wheelchair, bedside commode, etc.)   [ ] 1   [ ] 2   [ ] 3   [X] 4   3. Moving from lying on back to sitting on the side of the bed? [ ] 1   [ ] 2   [ ] 3   [X] 4   How much help from another person does the patient currently need. .. Total A Lot A Little None   4. Moving to and from a bed to a chair (including a wheelchair)? [ ] 1   [ ] 2   [ ] 3   [X] 4   5. Need to walk in hospital room? [ ] 1   [ ] 2   [ ] 3   [X] 4   6. Climbing 3-5 steps with a railing?    [ ] 1   [ ] 2   [ ] 3   [X] 4   © 2007, Trustees of [address] [place] [address] [address] [address], under license to [employer]. All rights reserved    Score:  Initial: 24 Most Recent: X (Date: -- )     Interpretation of Tool:  Represents activities that are increasingly more difficult (i.e. Bed mobility, Transfers, Gait). Score 24 23 22-20 19-15 14-10 9-7 6       Modifier CH CI CJ CK CL CM CN         · Mobility - Walking and Moving Around:               - CURRENT STATUS:    CH - 0% impaired, limited or restricted               - GOAL STATUS:           CH - 0% impaired, limited or restricted               - D/C STATUS:                       CH - 0% impaired, limited or restricted  Payor: [employer] [place] / Plan: [address] [place] [address] HMO / Product Type: [employer] Medicare /       Medical Necessity:     · none. Reason for Services/Other Comments:  · none. Use of outcome tool(s) and clinical judgement create a POC that gives a: Clear prediction of patient's progress: LOW COMPLEXITY                 TREATMENT:   (In addition to Assessment/Re-Assessment sessions the following treatments were rendered)   Pre-treatment Symptoms/Complaints: \" I was painting a piece of furniture the other day and I knitted 12 sets of slippers since Christmas. \"  Pain: Initial:   Pain Intensity 1: 0  Post Session:  0/10      Assessment/Reassessment only, no treatment provided today     Braces/Orthotics/Lines/Etc:   · none  · O2 Device: Room air  Treatment/Session Assessment:    · Response to Treatment:  No treatment provided  · Interdisciplinary Collaboration:  · Physical Therapist  · Registered Nurse  · After treatment position/precautions:  · Up in chair  · Bed/Chair-wheels locked  · Bed in low position  · Call light within reach  · RN notified  · Compliance with Program/Exercises: no exercise program issued. · Recommendations/Intent for next treatment session:   \"Next visit will focus on advancements to more challenging activities and reduction in assistance provided\".   Total Treatment Duration:  PT Patient Time In/Time Out  Time In: 1004  Time Out: [address], PT

## 2017-02-05 NOTE — ED TRIAGE NOTES
States she felt \"a little funny\" in her head and realized she had not checked her blood pressure \"in a while\". States normal systolic 540'V. When checked at home systolic 582'Q and higher.

## 2017-02-05 NOTE — ED PROVIDER NOTES
HPI Comments: Presents with complaint of elevated blood pressure. Patient reports  Her blood pressure is normally in the 130s and 40s and has never been in the 200s. Patient reports she was busy all day and was outside doing yard work and felt [employer] balance\" and mild dizziness when she stood up. She states the \"off-balance\" feeling is like she feel that might stumble when she walks. She denies any chest pain, shortness of breath, nausea, vomiting, or lower extremity swelling. She feels weak all over but nothing localizing. She's had no vision changes. She is not taking any new over-the-counter medications and she's had no changes in her blood pressure medication. She has never had a visit to the emergency department before. Patient is a 80 y.o. female presenting with hypertension. The history is provided by the patient. Hypertension    This is a new problem. The current episode started 12 to 24 hours ago. The problem has been gradually worsening. Associated symptoms include dizziness (reports feeling \"off balance\"). Pertinent negatives include no chest pain, no orthopnea, no palpitations, no PND, no blurred vision, no headaches, no tinnitus, no neck pain, no peripheral edema, no shortness of breath, no nausea and no vomiting. There are no associated agents to hypertension. Risk factors include hypertension.         Past Medical History:   Diagnosis Date    Arrhythmia      pvc's controlled by medication    Arthritis      osteo    CAD (coronary artery disease) 2007     cardiac stent x1    GERD (gastroesophageal reflux disease)      mostly controlled by medication    Hypercholesteraemia      controlled by medications    Hypertension 2004     controlled by medication    IBS (irritable bowel syndrome)     Insomnia     Liver disease      yellow jaundice at age 15 no problems since    Psychiatric disorder      anxiety no treatment at present    Seasonal allergies        Past Surgical History: Procedure Laterality Date    Vascular surgery procedure unlist  2003     laser treatment for vericose veins    Hx cataract removal  1999     brien wtih IOlens implants    Hx gyn       9 natural child births!  Hx orthopaedic  2005     brien carpal tunel    Pr cardiac surg procedure unlist  2007     cardiac stent x1         Family History:   Problem Relation Age of Onset    Hypertension Mother     Stroke Mother     Heart Disease Father     Heart Disease Sister     Hypertension Sister     Heart Disease Sister     Hypertension Sister        Social History     Social History    Marital status:      Spouse name: N/A    Number of children: N/A    Years of education: N/A     Occupational History    Not on file. Social History Main Topics    Smoking status: Never Smoker    Smokeless tobacco: Never Used    Alcohol use No    Drug use: Yes     Special: Prescription, OTC    Sexual activity: Not on file     Other Topics Concern    Not on file     Social History Narrative         ALLERGIES: Claritin-d 12 hour [loratadine-pseudoephedrine]; Codeine; Erythromycin; and Lipitor [atorvastatin]    Review of Systems   Constitutional: Negative for chills and fever. HENT: Negative for tinnitus. Eyes: Negative for blurred vision. Respiratory: Negative for shortness of breath. Cardiovascular: Negative for chest pain, palpitations, orthopnea and PND. Gastrointestinal: Negative for nausea and vomiting. Musculoskeletal: Negative for neck pain. Neurological: Positive for dizziness (reports feeling \"off balance\"). Negative for headaches. All other systems reviewed and are negative. Vitals:    02/04/17 2245 02/04/17 2300 02/04/17 2321 02/04/17 2322   BP: 175/78 170/75 (!) 211/79    Pulse: 78 81  (!) 108   Resp: 18 13  (!) 33   Temp:       SpO2: 97% 96%     Weight:       Height:                Physical Exam   Constitutional: She is oriented to person, place, and time.  She appears well-developed and well-nourished. No distress. HENT:   Head: Normocephalic and atraumatic. Eyes: Conjunctivae are normal. Pupils are equal, round, and reactive to light.   horizontal Nystagmus to the left that spontaneously resolves   Neck: Normal range of motion. Neck supple. Cardiovascular: Normal rate and regular rhythm. Pulmonary/Chest: Effort normal and breath sounds normal. No respiratory distress. She has no wheezes. She has no rales. Abdominal: Soft. Bowel sounds are normal. She exhibits no distension. There is no tenderness. There is no rebound and no guarding. Musculoskeletal: Normal range of motion. She exhibits no edema. Neurological: She is alert and oriented to person, place, and time. No cranial nerve deficit. Occasionally stumbled with ambulation   Skin: Skin is warm and dry. She is not diaphoretic. Psychiatric: She has a normal mood and affect. Her behavior is normal.   Nursing note and vitals reviewed. MDM  Number of Diagnoses or Management Options  Dizziness:   Hypertensive urgency:   Diagnosis management comments: After first dose of hydralazine patient's blood pressure improved significantly and patient felt better but \"jittery\" and appeared slightly anxious. She declined any Ativan. We walked her around the department and when she back in the room and he rechecked her blood pressure was again in the 200s. I reviewed her chart and she [facility] at [facility] and has never had blood pressure this high. Her symptoms of dizziness and \"off balance\" could Be related to a cerebellar stroke. I discussed her case with the hospitalist and she will be admitted for blood pressure control and a possible stroke workup. I discussed with the patient. She is agreeable to the plan. Her lab workup and CT here were negative. Her EKG showed new Q waves and T-wave inversion in one lead only and otherwise unchanged.        Amount and/or Complexity of Data Reviewed  Clinical lab tests: ordered and reviewed  Review and summarize past medical records: yes  Discuss the patient with other providers: yes  Independent visualization of images, tracings, or specimens: yes    Risk of Complications, Morbidity, and/or Mortality  Presenting problems: high  Diagnostic procedures: moderate  Management options: high    Patient Progress  Patient progress: improved    ED Course       Procedures

## 2017-02-05 NOTE — PROGRESS NOTES
Hospitalist Progress Note                  Hospitalist Progress Note    2017  Admit Date: 2017  8:49 PM   NAME: Justine Pino   :  1935   MRN:  [id number]   Attending: Laurie Ogden MD  PCP:  Bradly Landaverde MD  Treatment Team: Attending Provider: Laurie Ogden MD; Hospitalist: Manjit Weinberg MD    Full Code     SUBJECTIVE:   Patient present with elevated BP ,head pressure and imbalance. H/o carotid stenosis s/p L CEA  Pt offers no complaint. Recent Results (from the past 24 hour(s))   EKG, 12 LEAD, INITIAL    Collection Time: 17  8:40 PM   Result Value Ref Range    Systolic BP  mmHg    Diastolic BP  mmHg    Ventricular Rate 74 BPM    Atrial Rate 74 BPM    P-R Interval 226 ms    QRS Duration 78 ms    Q-T Interval 400 ms    QTC Calculation (Bezet) 444 ms    Calculated P Axis -14 degrees    Calculated R Axis 23 degrees    Calculated T Axis 9 degrees    Diagnosis       Sinus rhythm with 1st degree A-V block  Cannot rule out Inferior infarct , age undetermined  Abnormal ECG  When compared with ECG of 2017 20:36,  No significant change was found     CBC WITH AUTOMATED DIFF    Collection Time: 17  8:45 PM   Result Value Ref Range    WBC 7.8 4.3 - 11.1 K/uL    RBC 4.29 4.05 - 5.25 M/uL    HGB 12.3 11.7 - 15.4 g/dL    HCT 36.0 35.8 - 46.3 %    MCV 83.9 79.6 - 97.8 FL    MCH 28.7 26.1 - 32.9 PG    MCHC 34.2 31.4 - 35.0 g/dL    RDW 14.0 11.9 - 14.6 %    PLATELET 790 810 - 706 K/uL    MPV 8.8 (L) 10.8 - 14.1 FL    DF AUTOMATED      NEUTROPHILS 63 43 - 78 %    LYMPHOCYTES 25 13 - 44 %    MONOCYTES 11 4.0 - 12.0 %    EOSINOPHILS 1 0.5 - 7.8 %    BASOPHILS 0 0.0 - 2.0 %    IMMATURE GRANULOCYTES 0.1 0.0 - 5.0 %    ABS. NEUTROPHILS 4.9 1.7 - 8.2 K/UL    ABS. LYMPHOCYTES 1.9 0.5 - 4.6 K/UL    ABS. MONOCYTES 0.8 0.1 - 1.3 K/UL    ABS. EOSINOPHILS 0.1 0.0 - 0.8 K/UL    ABS. BASOPHILS 0.0 0.0 - 0.2 K/UL    ABS. IMM.  GRANS. 0.0 0.0 - 0.5 K/UL   METABOLIC PANEL, COMPREHENSIVE Collection Time: 02/04/17  8:45 PM   Result Value Ref Range    Sodium 135 (L) 136 - 145 mmol/L    Potassium 4.0 3.5 - 5.1 mmol/L    Chloride 100 98 - 107 mmol/L    CO2 27 21 - 32 mmol/L    Anion gap 8 7 - 16 mmol/L    Glucose 133 (H) 65 - 100 mg/dL    BUN 15 8 - 23 MG/DL    Creatinine 0.81 0.6 - 1.0 MG/DL    GFR est AA >60 >60 ml/min/1.73m2    GFR est non-AA >60 >60 ml/min/1.73m2    Calcium 7.7 (L) 8.3 - 10.4 MG/DL    Bilirubin, total 0.2 0.2 - 1.1 MG/DL    ALT (SGPT) 25 12 - 65 U/L    AST (SGOT) 23 15 - 37 U/L    Alk.  phosphatase 74 50 - 136 U/L    Protein, total 7.6 6.3 - 8.2 g/dL    Albumin 3.9 3.2 - 4.6 g/dL    Globulin 3.7 (H) 2.3 - 3.5 g/dL    A-G Ratio 1.1 (L) 1.2 - 3.5     TROPONIN I    Collection Time: 02/04/17  8:45 PM   Result Value Ref Range    Troponin-I, Qt. 0.02 0.02 - 0.05 NG/ML   TROPONIN I    Collection Time: 02/05/17  4:00 AM   Result Value Ref Range    Troponin-I, Qt. 0.07 (H) 0.02 - 0.05 NG/ML   LIPID PANEL    Collection Time: 02/05/17  4:00 AM   Result Value Ref Range    LIPID PROFILE          Cholesterol, total 192 <200 MG/DL    Triglyceride 31 (L) 35 - 150 MG/DL    HDL Cholesterol 88 (H) 40 - 60 MG/DL    LDL, calculated 97.8 <100 MG/DL    VLDL, calculated 6.2 6.0 - 23.0 MG/DL    CHOL/HDL Ratio 2.2     TSH 3RD GENERATION    Collection Time: 02/05/17  4:00 AM   Result Value Ref Range    TSH 1.420 0.358 - 3.740 uIU/mL       Allergies   Allergen Reactions    Claritin-D 12 Hour [Loratadine-Pseudoephedrine] Other (comments)     Heartburn per pt    Codeine Nausea and Vomiting     Light headed and faint    Erythromycin Diarrhea     Nausea and vomiting    Lipitor [Atorvastatin] Unknown (comments)     Current Facility-Administered Medications   Medication Dose Route Frequency Provider Last Rate Last Dose    aspirin delayed-release tablet 81 mg  81 mg Oral 7am Earline Herbert NP   81 mg at 02/05/17 0631    losartan (COZAAR) tablet 50 mg  50 mg Oral DAILY Isidor Hatchet, NP Scotty Nora metoprolol succinate (TOPROL-XL) XL tablet 50 mg  50 mg Oral 7am Earline Herbert NP   50 mg at 17 0630    pantoprazole (PROTONIX) tablet 40 mg  40 mg Oral ACB Earline Herbert NP        sertraline (ZOLOFT) tablet 50 mg  50 mg Oral DAILY Huseyinerodia Peters, WICHO        sodium chloride (NS) flush 5-10 mL  5-10 mL IntraVENous Q8H Earline Herbert NP   10 mL at 17 0641    sodium chloride (NS) flush 5-10 mL  5-10 mL IntraVENous PRN Huseyinerodia Lorraine, WICHO        enalaprilat (VASOTEC) injection 0.625 mg  0.625 mg IntraVENous Q4H PRN Huseyineron Lorraine, WICHO        acetaminophen (TYLENOL) tablet 650 mg  650 mg Oral Q4H PRN Huseyineron Lorraine, NP        enoxaparin (LOVENOX) injection 40 mg  40 mg SubCUTAneous Q24H Huseyineron Lorraine, NP        hydrALAZINE (APRESOLINE) 20 mg/mL injection 10 mg  10 mg IntraVENous Q6H PRN Huseyineron Lorraine, NP        sodium chloride (NS) flush 5-10 mL  5-10 mL IntraVENous Q8H Laquita Khan MD        sodium chloride (NS) flush 5-10 mL  5-10 mL IntraVENous PRN Laquita Khan MD               Review of Systems negative with exception of pertinent positives noted above  PHYSICAL EXAM     Visit Vitals    /68 (BP 1 Location: Left arm, BP Patient Position: At rest)    Pulse 86    Temp 97.7 °F (36.5 °C)    Resp 18    Ht 5' (1.524 m)    Wt 56.7 kg (125 lb)    SpO2 99%    BMI 24.41 kg/m2      Temp (24hrs), Av.7 °F (36.5 °C), Min:97.5 °F (36.4 °C), Max:97.9 °F (36.6 °C)    Oxygen Therapy  O2 Sat (%): 99 % (17 0400)  Pulse via Oximetry: 82 beats per minute (17 0209)  O2 Device: Room air (17 0209)  No intake or output data in the 24 hours ending 17 0755   General: No acute distress    Lungs:  CTA, normal respiratory effort  Heart:  RRR no  Murmur, gallops or rubs  Abdomen: Soft NT, ND, NHSM  Extremities: No cyanosis, clubbing or edema  Neurologic:  CN 2- 12 intact, no sensory or motor deficit      DIAGNOSTIC STUDIES ASSESSMENT      Active Hospital Problems    Diagnosis Date Noted    Hypertensive urgency  BP controlled  --- continue present merds  --- imbalance with head pressure rersolved most likely Dx malignant HTN with ?  Hypertensive encephalopathy v s anxiety / panic attack  MRI /CDS pending 02/05/2017    S/P PTCA/STENT 08/17/2016    CAD (coronary artery disease) 07/12/2010     -native vessel       Carotid artery stenosis 07/12/2010     s/p left CEA, worsening Right ICA disease by duplex 11/2012        Hyperlipidemia 07/12/2010    GERD (gastroesophageal reflux disease) 07/12/2010       Plan:D/c home in am      DVT Prophylaxis:

## 2017-02-06 NOTE — DISCHARGE INSTRUCTIONS
DISCHARGE SUMMARY from Nurse    The following personal items are in your possession at time of discharge:       Visual Aid: Glasses, With patient        Jewelry: Ring     Other Valuables: Cell Phone             PATIENT INSTRUCTIONS:    After general anesthesia or intravenous sedation, for 24 hours or while taking prescription Narcotics:  · Limit your activities  · Do not drive and operate hazardous machinery  · Do not make important personal or business decisions  · Do  not drink alcoholic beverages  · If you have not urinated within 8 hours after discharge, please contact your surgeon on call. Report the following to your surgeon:  · Excessive pain, swelling, redness or odor of or around the surgical area  · Temperature over 100.5  · Nausea and vomiting lasting longer than 4 hours or if unable to take medications  · Any signs of decreased circulation or nerve impairment to extremity: change in color, persistent  numbness, tingling, coldness or increase pain  · Any questions        What to do at Home:  Recommended activity: Activity as tolerated,. Please follow up with your primary doctor so that your blood pressure can be monitored. .      *  Please give a list of your current medications to your Primary Care Provider. *  Please update this list whenever your medications are discontinued, doses are      changed, or new medications (including over-the-counter products) are added. *  Please carry medication information at all times in case of emergency situations. These are general instructions for a healthy lifestyle:    No smoking/ No tobacco products/ Avoid exposure to second hand smoke    Surgeon General's Warning:  Quitting smoking now greatly reduces serious risk to your health.     Obesity, smoking, and sedentary lifestyle greatly increases your risk for illness    A healthy diet, regular physical exercise & weight monitoring are important for maintaining a healthy lifestyle    You may be retaining fluid if you have a history of heart failure or if you experience any of the following symptoms:  Weight gain of 3 pounds or more overnight or 5 pounds in a week, increased swelling in our hands or feet or shortness of breath while lying flat in bed. Please call your doctor as soon as you notice any of these symptoms; do not wait until your next office visit. Recognize signs and symptoms of STROKE:    F-face looks uneven    A-arms unable to move or move unevenly    S-speech slurred or non-existent    T-time-call 911 as soon as signs and symptoms begin-DO NOT go       Back to bed or wait to see if you get better-TIME IS BRAIN. Warning Signs of HEART ATTACK     Call 911 if you have these symptoms:   Chest discomfort. Most heart attacks involve discomfort in the center of the chest that lasts more than a few minutes, or that goes away and comes back. It can feel like uncomfortable pressure, squeezing, fullness, or pain.  Discomfort in other areas of the upper body. Symptoms can include pain or discomfort in one or both arms, the back, neck, jaw, or stomach.  Shortness of breath with or without chest discomfort.  Other signs may include breaking out in a cold sweat, nausea, or lightheadedness. Don't wait more than five minutes to call 911 - MINUTES MATTER! Fast action can save your life. Calling 911 is almost always the fastest way to get lifesaving treatment. Emergency Medical Services staff can begin treatment when they arrive -- up to an hour sooner than if someone gets to the hospital by car. The discharge information has been reviewed with the patient. The patient verbalized understanding. Discharge medications reviewed with the patient and appropriate educational materials and side effects teaching were provided.                High Blood Pressure: Care Instructions  Your Care Instructions  If your blood pressure is usually above 140/90, you have high blood pressure, or hypertension. That means the top number is 140 or higher or the bottom number is 90 or higher, or both. Despite what a lot of people think, high blood pressure usually doesn't cause headaches or make you feel dizzy or lightheaded. It usually has no symptoms. But it does increase your risk for heart attack, stroke, and kidney or eye damage. The higher your blood pressure, the more your risk increases. Your doctor will give you a goal for your blood pressure. Your goal will be based on your health and your age. An example of a goal is to keep your blood pressure below 140/90. Lifestyle changes, such as eating healthy and being active, are always important to help lower blood pressure. You might also take medicine to reach your blood pressure goal.  Follow-up care is a key part of your treatment and safety. Be sure to make and go to all appointments, and call your doctor if you are having problems. It's also a good idea to know your test results and keep a list of the medicines you take. How can you care for yourself at home? Medical treatment  · If you stop taking your medicine, your blood pressure will go back up. You may take one or more types of medicine to lower your blood pressure. Be safe with medicines. Take your medicine exactly as prescribed. Call your doctor if you think you are having a problem with your medicine. · Talk to your doctor before you start taking aspirin every day. Aspirin can help certain people lower their risk of a heart attack or stroke. But taking aspirin isn't right for everyone, because it can cause serious bleeding. · See your doctor regularly. You may need to see the doctor more often at first or until your blood pressure comes down. · If you are taking blood pressure medicine, talk to your doctor before you take decongestants or anti-inflammatory medicine, such as ibuprofen. Some of these medicines can raise blood pressure.   · Learn how to check your blood pressure at home.  Lifestyle changes  · Stay at a healthy weight. This is especially important if you put on weight around the waist. Losing even 10 pounds can help you lower your blood pressure. · If your doctor recommends it, get more exercise. Walking is a good choice. Bit by bit, increase the amount you walk every day. Try for at least 30 minutes on most days of the week. You also may want to swim, bike, or do other activities. · Avoid or limit alcohol. Talk to your doctor about whether you can drink any alcohol. · Try to limit how much sodium you eat to less than 2,300 milligrams (mg) a day. Your doctor may ask you to try to eat less than 1,500 mg a day. · Eat plenty of fruits (such as bananas and oranges), vegetables, legumes, whole grains, and low-fat dairy products. · Lower the amount of saturated fat in your diet. Saturated fat is found in animal products such as milk, cheese, and meat. Limiting these foods may help you lose weight and also lower your risk for heart disease. · Do not smoke. Smoking increases your risk for heart attack and stroke. If you need help quitting, talk to your doctor about stop-smoking programs and medicines. These can increase your chances of quitting for good. When should you call for help? Call 911 anytime you think you may need emergency care. This may mean having symptoms that suggest that your blood pressure is causing a serious heart or blood vessel problem. Your blood pressure may be over 180/110. For example, call 911 if:  · You have symptoms of a heart attack. These may include:  ¨ Chest pain or pressure, or a strange feeling in the chest.  ¨ Sweating. ¨ Shortness of breath. ¨ Nausea or vomiting. ¨ Pain, pressure, or a strange feeling in the back, neck, jaw, or upper belly or in one or both shoulders or arms. ¨ Lightheadedness or sudden weakness. ¨ A fast or irregular heartbeat. · You have symptoms of a stroke.  These may include:  ¨ Sudden numbness, tingling, weakness, or loss of movement in your face, arm, or leg, especially on only one side of your body. ¨ Sudden vision changes. ¨ Sudden trouble speaking. ¨ Sudden confusion or trouble understanding simple statements. ¨ Sudden problems with walking or balance. ¨ A sudden, severe headache that is different from past headaches. · You have severe back or belly pain. Do not wait until your blood pressure comes down on its own. Get help right away. Call your doctor now or seek immediate care if:  · Your blood pressure is much higher than normal (such as 180/110 or higher), but you don't have symptoms. · You think high blood pressure is causing symptoms, such as:  ¨ Severe headache. ¨ Blurry vision. Watch closely for changes in your health, and be sure to contact your doctor if:  · Your blood pressure measures 140/90 or higher at least 2 times. That means the top number is 140 or higher or the bottom number is 90 or higher, or both. · You think you may be having side effects from your blood pressure medicine. · Your blood pressure is usually normal, but it goes above normal at least 2 times. Where can you learn more? Go to http://maria c-boubacar.info/. Enter A713 in the search box to learn more about \"High Blood Pressure: Care Instructions. \"  Current as of: August 8, 2016  Content Version: 11.1  © 6147-0567 [employer]. Care instructions adapted under license by [employer] (which disclaims liability or warranty for this information). If you have questions about a medical condition or this instruction, always ask your healthcare professional. [place] [address] any warranty or liability for your use of this information.

## 2017-02-06 NOTE — PROGRESS NOTES
Patient and family verbalized understanding of diet, activity, s/sx as reviewed, and follow up appointment. No Rx. Discharge instructions given to patient.

## 2017-02-06 NOTE — DISCHARGE SUMMARY
Physician Discharge Summary       Patient: Zenia Hernandez MRN: [id number]  SSN: [id number]    YOB: 1935  Age: 80 y.o. Sex: female    PCP: Thuy Butt MD    Admit date: 2/4/2017  Admitting Provider: Marixa Obando MD    Discharge date: 2/5/2017  Discharging Provider: Samanta Ramon NP    Admission Diagnoses:   Hypertensive urgency  ataxia  GERD  CAD  Hyperlipidemia  S/P Cardiac stent placement  Carotid artery stenosis    Discharge Diagnoses:    Hypertensive urgency, resolved  Ataxia, resolved  GERD on meds  CADon meds   Hyperlipidemia, on meds  S/P Cardiac stent placement  Carotid artery stenosis, observation    Hospital Course: Patient is a pleasant, alert, oriented 80 y.o.  female who presented to ER 2/4 with complaints of blood pressure elevation. She took her blood pressure after she came inside after doing yard work all day feeling \"unbalanced. \" She denies overt dizziness, vertigo, chest pain, shortness of breath, recent illness or exposure to same. She is compliant with all of her meds, has history of cardiac stent some 8 years ago. She rarely is ill, and has very rare ER visits. She is unable to give any further information, states she feels as if she will stumble. I observed her ambulating to the bathroom with assist, and she did seem off kilter, first leaning to the right, then overcorrecting. She also notes she took 2 ASA 81 mg prior to arrival. History includes IBS, denies any recent diarrhea or any GI symptoms. Additional history as noted below. Her hospital stay was uneventful, awaking the am after admission feeling fine. No further ataxia, no complaints. Wants to go home. Test results as below. She is encouraged to see her [facility] ASAP for blood pressure regulation. She feels as if the ataxia may have been due to sinus and ear congestion after she was working in the yard in windy conditions.        Procedures: None    Consults: Physical therapy    Significant Diagnostic Studies:   ADMISSION LABS:      Ref. Range 2/4/2017 20:45   WBC Latest Ref Range: 4.3 - 11.1 K/uL 7.8   RBC Latest Ref Range: 4.05 - 5.25 M/uL 4.29   HGB Latest Ref Range: 11.7 - 15.4 g/dL 12.3   HCT Latest Ref Range: 35.8 - 46.3 % 36.0   MCV Latest Ref Range: 79.6 - 97.8 FL 83.9   MCH Latest Ref Range: 26.1 - 32.9 PG 28.7   MCHC Latest Ref Range: 31.4 - 35.0 g/dL 34.2   RDW Latest Ref Range: 11.9 - 14.6 % 14.0   PLATELET Latest Ref Range: 150 - 450 K/uL 269   MPV Latest Ref Range: 10.8 - 14.1 FL 8.8 (L)   NEUTROPHILS Latest Ref Range: 43 - 78 % 63   LYMPHOCYTES Latest Ref Range: 13 - 44 % 25   MONOCYTES Latest Ref Range: 4.0 - 12.0 % 11   EOSINOPHILS Latest Ref Range: 0.5 - 7.8 % 1   BASOPHILS Latest Ref Range: 0.0 - 2.0 % 0   IMMATURE GRANULOCYTES Latest Ref Range: 0.0 - 5.0 % 0.1   DF Latest Units:   AUTOMATED   ABS. NEUTROPHILS Latest Ref Range: 1.7 - 8.2 K/UL 4.9   ABS. IMM. GRANS. Latest Ref Range: 0.0 - 0.5 K/UL 0.0   ABS. LYMPHOCYTES Latest Ref Range: 0.5 - 4.6 K/UL 1.9   ABS. MONOCYTES Latest Ref Range: 0.1 - 1.3 K/UL 0.8   ABS. EOSINOPHILS Latest Ref Range: 0.0 - 0.8 K/UL 0.1   ABS.  BASOPHILS Latest Ref Range: 0.0 - 0.2 K/UL 0.0   Sodium Latest Ref Range: 136 - 145 mmol/L 135 (L)   Potassium Latest Ref Range: 3.5 - 5.1 mmol/L 4.0   Chloride Latest Ref Range: 98 - 107 mmol/L 100   CO2 Latest Ref Range: 21 - 32 mmol/L 27   Anion gap Latest Ref Range: 7 - 16 mmol/L 8   Glucose Latest Ref Range: 65 - 100 mg/dL 133 (H)   BUN Latest Ref Range: 8 - 23 MG/DL 15   Creatinine Latest Ref Range: 0.6 - 1.0 MG/DL 0.81   Calcium Latest Ref Range: 8.3 - 10.4 MG/DL 7.7 (L)   GFR est non-AA Latest Ref Range: >60 ml/min/1.73m2 >60   GFR est AA Latest Ref Range: >60 ml/min/1.73m2 >60   Bilirubin, total Latest Ref Range: 0.2 - 1.1 MG/DL 0.2   Protein, total Latest Ref Range: 6.3 - 8.2 g/dL 7.6   Albumin Latest Ref Range: 3.2 - 4.6 g/dL 3.9   Globulin Latest Ref Range: 2.3 - 3.5 g/dL 3.7 (H)   A-G Ratio Latest Ref Range: 1.2 - 3.5   1.1 (L)   ALT Latest Ref Range: 12 - 65 U/L 25   AST Latest Ref Range: 15 - 37 U/L 23   Alk. phosphatase Latest Ref Range: 50 - 136 U/L 74   Troponin-I, Qt. Latest Ref Range: 0.02 - 0.05 NG/ML 0.02        Ref. Range 2/5/2017 04:00 2/5/2017 09:45   Triglyceride Latest Ref Range: 35 - 150 MG/DL 31 (L)    Cholesterol, total Latest Ref Range: <200 MG/    HDL Cholesterol Latest Ref Range: 40 - 60 MG/DL 88 (H)    CHOL/HDL Ratio Latest Units:   2.2    LDL, calculated Latest Ref Range: <100 MG/DL 97.8    VLDL, calculated Latest Ref Range: 6.0 - 23.0 MG/DL 6.2    Troponin-I, Qt. Latest Ref Range: 0.02 - 0.05 NG/ML 0.07 (H) 0.08 (H)   TSH Latest Ref Range: 0.358 - 3.740 uIU/mL 1.420         Ref. Range 2/5/2017 09:45 2/5/2017 11:50   Color Latest Units:    YELLOW   Appearance Latest Units:    CLOUDY   Specific gravity Latest Ref Range: 1.001 - 1.023    1.009   pH (UA) Latest Ref Range: 5.0 - 9.0    7.5   Protein Latest Ref Range: NEG mg/dL  NEGATIVE   Glucose Latest Units: mg/dL  NEGATIVE   Ketone Latest Ref Range: NEG mg/dL  NEGATIVE   Blood Latest Ref Range: NEG    SMALL (A)   Bilirubin Latest Ref Range: NEG    NEGATIVE   Urobilinogen Latest Ref Range: 0.2 - 1.0 EU/dL  0.2   Nitrites Latest Ref Range: NEG    NEGATIVE   Leukocyte Esterase Latest Ref Range: NEG    SMALL (A)   Epithelial cells Latest Ref Range: 0 /hpf  0-3   WBC Latest Ref Range: 0 /hpf  0-3   RBC Latest Ref Range: 0 /hpf  0-3   Bacteria Latest Ref Range: 0 /hpf  TRACE   Casts Latest Ref Range: 0 /lpf  0-3     RADIOLOGY  CT HEAD:  No acute intracranial abnormality. No mass effect. CXR:  There is no focal pulmonary consolidation. No pleural effusion, pneumothorax or evidence of ulmonary edema. The cardiomediastinal contour is within normal limits. The surrounding bones are intact. IMPRESSION:  No acute pulmonary disease    MRI BRAIN:  Unremarkable brain MRI examination without gadolinium for age.  No acute pathology identified. DUPLEX CAROTID DOPPLER: No hemodynamically significant stenosis by NASCET criteria. Discharge Exam:  /63     Pulse 86    Temp 97.9 °F (36.6 °C)    Resp 30    Ht 5' (1.524 m)    Wt 56.7 kg (125 lb)    SpO2 95%    BMI 24.41 kg/m2      General appearance: oriented and alert, cooperative, denies any symptoms other than ataxia. Well nourished, well hydrated. Appears much younger than stated age. Very well groomed. Daughter at bedside. Patient reports she thinks this may have been sinus problems. Head: Normocephalic, without obvious abnormality, atraumatic  Throat: Lips, mucosa, and tongue normal. Teeth and gums normal  Neck: supple, symmetrical, trachea midline, no adenopathy, thyroid:  not enlarged, symmetric, no tenderness/mass/nodules, no carotid bruit and no JVD  Lungs: clear to auscultation bilaterally  Heart: regular rate and rhythm, S1, S2 normal, no murmur, click, rub or gallop  Abdomen: soft, non-tender. Bowel sounds normal. No masses, no organomegaly  Extremities: All extremities normal, atraumatic, no cyanosis or edema. Skin: Skin color, texture, turgor normal. No rashes or lesions  Neurologic: Grossly normal, no unilateral weakness, however gait remains somewhat erratic       Discharge Condition: good, improved and stable  Disposition: Home    Discharge Medications:  Current Discharge Medication List      CONTINUE these medications which have NOT CHANGED    Details   cholecalciferol (VITAMIN D3) 1,000 unit cap Take 1,000 Units by mouth two (2) times a day. losartan (COZAAR) 50 mg tablet TAKE 1 TABLET EVERY DAY FOR HYPERTENSION (CHANGE IN DOSE). Qty: 90 Tab, Refills: 3      sertraline (ZOLOFT) 50 mg tablet Take 1 Tab by mouth daily. Indications: ANXIETY WITH DEPRESSION  Qty: 90 Tab, Refills: 1    Associated Diagnoses: Depression with anxiety      metoprolol succinate (TOPROL XL) 50 mg XL tablet Take 1 Tab by mouth every morning.  Indications: HYPERTENSION  Qty: 90 Tab, Refills: 1    Associated Diagnoses: Essential hypertension; Coronary artery disease due to calcified coronary lesion      omeprazole (PRILOSEC) 20 mg capsule Take 1 Cap by mouth daily as needed. Indications: HEARTBURN  Qty: 90 Cap, Refills: 1    Associated Diagnoses: Heartburn      pravastatin (PRAVACHOL) 40 mg tablet Take 1 Tab by mouth nightly. Qty: 80 Tab, Refills: 1    Associated Diagnoses: Coronary artery disease due to calcified coronary lesion; Hyperlipidemia      MULTIVITAMINS (MULTI-VITAMIN PO) Take 1 Tab by mouth every morning. aspirin delayed-release (ASPIR-81) 81 mg tablet Take 81 mg by mouth every morning. fish oil-dha-epa (FISH OIL) 1,200-144-216 mg Cap Take 1 Tab by mouth every morning. coenzyme q10-vitamin e (COQ10 ) 100-100 mg-unit Cap Take 1 Tab by mouth every morning. Indications: Taking 200mg      chromium 200 mcg Cap Take 1 Tab by mouth every morning. Indications: taking PRN      LOPERAMIDE HCL (IMODIUM A-D PO) Take 0.5 Tabs by mouth as needed. Follow-up Care/Patient Instructions: Activity: Activity as tolerated  Diet: Regular Diet    Follow-up Information     Follow up With Details Comments [address], [place]            CARDIOLOGY WITHIN THE NEXT FEW DAYS FOR BLOOD PRESSURE RECHECK. SOONER IF PROBLEMS    KEEP APPOINTMENT AS PLANNED NEXT WEEK.   IF ANY WORSENING, SEE THEM SOONER  [address] [place] [address]  [phone]          Signed:  Matt Garcia NP  2/5/2017  7:25 PM    DISCHARGE TIME GREATER THAN 30 MINUTES

## 2019-07-01 ENCOUNTER — HOSPITAL ENCOUNTER (OUTPATIENT)
Dept: ULTRASOUND IMAGING | Age: 84
Discharge: HOME | End: 2019-07-01
Attending: INTERNAL MEDICINE
Payer: MEDICARE

## 2019-07-01 DIAGNOSIS — I25.10 CORONARY ARTERY DISEASE INVOLVING NATIVE CORONARY ARTERY OF NATIVE HEART WITHOUT ANGINA PECTORIS: Chronic | ICD-10-CM

## 2019-07-01 DIAGNOSIS — E78.00 PURE HYPERCHOLESTEROLEMIA: Chronic | ICD-10-CM

## 2019-07-01 DIAGNOSIS — K21.9 GASTROESOPHAGEAL REFLUX DISEASE WITHOUT ESOPHAGITIS: Chronic | ICD-10-CM

## 2019-07-01 DIAGNOSIS — Z51.89 ENCOUNTER FOR MEDICATION ADJUSTMENT: ICD-10-CM

## 2019-07-01 DIAGNOSIS — R11.0 NAUSEA: ICD-10-CM

## 2019-07-01 DIAGNOSIS — I10 ESSENTIAL HYPERTENSION: ICD-10-CM

## 2019-07-01 PROCEDURE — 76705 ECHO EXAM OF ABDOMEN: CPT

## 2020-06-08 ENCOUNTER — HOSPITAL ENCOUNTER (OUTPATIENT)
Dept: LAB | Age: 85
Discharge: HOME | End: 2020-06-08
Attending: INTERNAL MEDICINE
Payer: MEDICARE

## 2020-06-08 DIAGNOSIS — I10 ESSENTIAL HYPERTENSION: ICD-10-CM

## 2020-06-08 DIAGNOSIS — I25.10 CORONARY ARTERY DISEASE INVOLVING NATIVE CORONARY ARTERY OF NATIVE HEART WITHOUT ANGINA PECTORIS: Chronic | ICD-10-CM

## 2020-06-08 LAB
ANION GAP SERPL CALC-SCNC: 10 MMOL/L (ref 7–16)
BUN SERPL-MCNC: 14 MG/DL (ref 8–23)
CALCIUM SERPL-MCNC: 8 MG/DL (ref 8.3–10.4)
CHLORIDE SERPL-SCNC: 90 MMOL/L (ref 98–107)
CO2 SERPL-SCNC: 26 MMOL/L (ref 21–32)
CREAT SERPL-MCNC: 0.75 MG/DL (ref 0.6–1)
GLUCOSE SERPL-MCNC: 98 MG/DL (ref 65–100)
MAGNESIUM SERPL-MCNC: 2 MG/DL (ref 1.8–2.4)
POTASSIUM SERPL-SCNC: 4.2 MMOL/L (ref 3.5–5.1)
SODIUM SERPL-SCNC: 126 MMOL/L (ref 136–145)

## 2020-06-08 PROCEDURE — 83735 ASSAY OF MAGNESIUM: CPT

## 2020-06-08 PROCEDURE — 36415 COLL VENOUS BLD VENIPUNCTURE: CPT

## 2020-06-08 PROCEDURE — 80048 BASIC METABOLIC PNL TOTAL CA: CPT

## 2020-06-19 ENCOUNTER — HOSPITAL ENCOUNTER (OUTPATIENT)
Dept: LAB | Age: 85
Discharge: HOME | End: 2020-06-19
Payer: MEDICARE

## 2020-06-19 DIAGNOSIS — I25.10 CORONARY ARTERY DISEASE INVOLVING NATIVE CORONARY ARTERY OF NATIVE HEART WITHOUT ANGINA PECTORIS: ICD-10-CM

## 2020-06-19 LAB
ANION GAP SERPL CALC-SCNC: 9 MMOL/L (ref 7–16)
BUN SERPL-MCNC: 13 MG/DL (ref 8–23)
CALCIUM SERPL-MCNC: 7.5 MG/DL (ref 8.3–10.4)
CHLORIDE SERPL-SCNC: 93 MMOL/L (ref 98–107)
CO2 SERPL-SCNC: 26 MMOL/L (ref 21–32)
CREAT SERPL-MCNC: 0.86 MG/DL (ref 0.6–1)
GLUCOSE SERPL-MCNC: 129 MG/DL (ref 65–100)
POTASSIUM SERPL-SCNC: 4 MMOL/L (ref 3.5–5.1)
SODIUM SERPL-SCNC: 128 MMOL/L (ref 136–145)

## 2020-06-19 PROCEDURE — 36415 COLL VENOUS BLD VENIPUNCTURE: CPT

## 2020-06-19 PROCEDURE — 80048 BASIC METABOLIC PNL TOTAL CA: CPT

## 2022-03-18 PROBLEM — I16.0 HYPERTENSIVE URGENCY: Status: ACTIVE | Noted: 2017-02-05

## 2022-10-25 RX ORDER — POTASSIUM CHLORIDE 750 MG/1
TABLET, EXTENDED RELEASE ORAL
Qty: 90 TABLET | OUTPATIENT
Start: 2022-10-25

## 2022-11-03 NOTE — TELEPHONE ENCOUNTER
Pt needs an appt before refills given. Called patient and left voice mail to return call to Priyank Galarza.

## 2022-11-08 RX ORDER — POTASSIUM CHLORIDE 750 MG/1
TABLET, EXTENDED RELEASE ORAL
Qty: 90 TABLET | Refills: 3 | OUTPATIENT
Start: 2022-11-08

## 2022-12-30 ENCOUNTER — APPOINTMENT (OUTPATIENT)
Dept: CT IMAGING | Age: 87
End: 2022-12-30
Payer: MEDICARE

## 2022-12-30 ENCOUNTER — HOSPITAL ENCOUNTER (EMERGENCY)
Age: 87
Discharge: HOME | End: 2022-12-30
Attending: PHYSICIAN ASSISTANT
Payer: MEDICARE

## 2022-12-30 VITALS
BODY MASS INDEX: 22.28 KG/M2 | HEART RATE: 81 BPM | DIASTOLIC BLOOD PRESSURE: 92 MMHG | SYSTOLIC BLOOD PRESSURE: 169 MMHG | WEIGHT: 118 LBS | OXYGEN SATURATION: 96 % | RESPIRATION RATE: 16 BRPM | TEMPERATURE: 98 F | HEIGHT: 61 IN

## 2022-12-30 DIAGNOSIS — I10 PRIMARY HYPERTENSION: Primary | ICD-10-CM

## 2022-12-30 DIAGNOSIS — R82.998 LEUKOCYTES IN URINE: ICD-10-CM

## 2022-12-30 LAB
ALBUMIN SERPL-MCNC: 3.4 G/DL (ref 3.2–4.6)
ALBUMIN/GLOB SERPL: 0.9 {RATIO} (ref 0.4–1.6)
ALP SERPL-CCNC: 70 U/L (ref 50–136)
ALT SERPL-CCNC: 23 U/L (ref 12–65)
ANION GAP SERPL CALC-SCNC: 9 MMOL/L (ref 2–11)
APPEARANCE UR: CLEAR
AST SERPL-CCNC: 20 U/L (ref 15–37)
BACTERIA URNS QL MICRO: 0 /HPF
BASOPHILS # BLD: 0.1 K/UL (ref 0–0.2)
BASOPHILS NFR BLD: 1 % (ref 0–2)
BILIRUB SERPL-MCNC: 0.3 MG/DL (ref 0.2–1.1)
BILIRUB UR QL: NEGATIVE
BILIRUB UR QL: NEGATIVE
BUN SERPL-MCNC: 7 MG/DL (ref 8–23)
CALCIUM SERPL-MCNC: 8 MG/DL (ref 8.3–10.4)
CASTS URNS QL MICRO: 0 /LPF
CHLORIDE SERPL-SCNC: 100 MMOL/L (ref 101–110)
CO2 SERPL-SCNC: 26 MMOL/L (ref 21–32)
COLOR UR: ABNORMAL
CREAT SERPL-MCNC: 0.8 MG/DL (ref 0.6–1)
CRYSTALS URNS QL MICRO: 0 /LPF
DIFFERENTIAL METHOD BLD: ABNORMAL
EKG ATRIAL RATE: 78 BPM
EKG DIAGNOSIS: NORMAL
EKG P AXIS: 55 DEGREES
EKG P-R INTERVAL: 208 MS
EKG Q-T INTERVAL: 400 MS
EKG QRS DURATION: 78 MS
EKG QTC CALCULATION (BAZETT): 456 MS
EKG R AXIS: -13 DEGREES
EKG T AXIS: 20 DEGREES
EKG VENTRICULAR RATE: 78 BPM
EOSINOPHIL # BLD: 0 K/UL (ref 0–0.8)
EOSINOPHIL NFR BLD: 0 % (ref 0.5–7.8)
EPI CELLS #/AREA URNS HPF: NORMAL /HPF
ERYTHROCYTE [DISTWIDTH] IN BLOOD BY AUTOMATED COUNT: 12.9 % (ref 11.9–14.6)
GLOBULIN SER CALC-MCNC: 3.8 G/DL (ref 2.8–4.5)
GLUCOSE SERPL-MCNC: 136 MG/DL (ref 65–100)
GLUCOSE UR QL STRIP.AUTO: NEGATIVE MG/DL
GLUCOSE UR STRIP.AUTO-MCNC: NEGATIVE MG/DL
HCT VFR BLD AUTO: 35.7 % (ref 35.8–46.3)
HGB BLD-MCNC: 12.3 G/DL (ref 11.7–15.4)
HGB UR QL STRIP: NEGATIVE
IMM GRANULOCYTES # BLD AUTO: 0.1 K/UL (ref 0–0.5)
IMM GRANULOCYTES NFR BLD AUTO: 1 % (ref 0–5)
INR PPP: 0.9
KETONES UR QL STRIP.AUTO: 15 MG/DL
KETONES UR-MCNC: 15 MG/DL
LEUKOCYTE ESTERASE UR QL STRIP.AUTO: ABNORMAL
LEUKOCYTE ESTERASE UR QL STRIP: ABNORMAL
LYMPHOCYTES # BLD: 1.7 K/UL (ref 0.5–4.6)
LYMPHOCYTES NFR BLD: 14 % (ref 13–44)
MAGNESIUM SERPL-MCNC: 1.9 MG/DL (ref 1.8–2.4)
MCH RBC QN AUTO: 30.1 PG (ref 26.1–32.9)
MCHC RBC AUTO-ENTMCNC: 34.5 G/DL (ref 31.4–35)
MCV RBC AUTO: 87.3 FL (ref 82–102)
MONOCYTES # BLD: 1 K/UL (ref 0.1–1.3)
MONOCYTES NFR BLD: 9 % (ref 4–12)
MUCOUS THREADS URNS QL MICRO: 0 /LPF
NEUTS SEG # BLD: 9 K/UL (ref 1.7–8.2)
NEUTS SEG NFR BLD: 75 % (ref 43–78)
NITRITE UR QL STRIP.AUTO: NEGATIVE
NITRITE UR QL: NEGATIVE
NRBC # BLD: 0 K/UL (ref 0–0.2)
OTHER OBSERVATIONS: NORMAL
PH UR STRIP: 7 [PH] (ref 5–9)
PH UR: 7 [PH] (ref 5–9)
PLATELET # BLD AUTO: 340 K/UL (ref 150–450)
PMV BLD AUTO: 8.6 FL (ref 9.4–12.3)
POTASSIUM SERPL-SCNC: 3.3 MMOL/L (ref 3.5–5.1)
PROT SERPL-MCNC: 7.2 G/DL (ref 6.3–8.2)
PROT UR QL: NEGATIVE MG/DL
PROT UR STRIP-MCNC: NEGATIVE MG/DL
PROTHROMBIN TIME: 12.9 SEC (ref 12.6–14.3)
RBC # BLD AUTO: 4.09 M/UL (ref 4.05–5.2)
RBC # UR STRIP: ABNORMAL /UL
RBC #/AREA URNS HPF: NORMAL /HPF
SERVICE CMNT-IMP: ABNORMAL
SODIUM SERPL-SCNC: 135 MMOL/L (ref 133–143)
SP GR UR REFRACTOMETRY: 1.01 (ref 1–1.02)
SP GR UR: 1.01 (ref 1–1.02)
TROPONIN I SERPL HS-MCNC: 59.2 PG/ML (ref 0–14)
TROPONIN I SERPL HS-MCNC: 61.6 PG/ML (ref 0–14)
UROBILINOGEN UR QL STRIP.AUTO: 0.2 EU/DL (ref 0.2–1)
UROBILINOGEN UR QL: 0.2 EU/DL (ref 0.2–1)
WBC # BLD AUTO: 11.8 K/UL (ref 4.3–11.1)
WBC URNS QL MICRO: NORMAL /HPF

## 2022-12-30 PROCEDURE — 99284 EMERGENCY DEPT VISIT MOD MDM: CPT

## 2022-12-30 PROCEDURE — 83735 ASSAY OF MAGNESIUM: CPT

## 2022-12-30 PROCEDURE — 81003 URINALYSIS AUTO W/O SCOPE: CPT

## 2022-12-30 PROCEDURE — 84484 ASSAY OF TROPONIN QUANT: CPT

## 2022-12-30 PROCEDURE — 81015 MICROSCOPIC EXAM OF URINE: CPT

## 2022-12-30 PROCEDURE — 85610 PROTHROMBIN TIME: CPT

## 2022-12-30 PROCEDURE — 93005 ELECTROCARDIOGRAM TRACING: CPT | Performed by: PHYSICIAN ASSISTANT

## 2022-12-30 PROCEDURE — 80053 COMPREHEN METABOLIC PANEL: CPT

## 2022-12-30 PROCEDURE — 85025 COMPLETE CBC W/AUTO DIFF WBC: CPT

## 2022-12-30 PROCEDURE — 70450 CT HEAD/BRAIN W/O DYE: CPT

## 2022-12-30 PROCEDURE — 87086 URINE CULTURE/COLONY COUNT: CPT

## 2022-12-30 PROCEDURE — 81001 URINALYSIS AUTO W/SCOPE: CPT

## 2022-12-30 RX ORDER — CEPHALEXIN 500 MG/1
500 CAPSULE ORAL 4 TIMES DAILY
Qty: 28 CAPSULE | Refills: 0 | Status: SHIPPED | OUTPATIENT
Start: 2022-12-30 | End: 2023-01-06

## 2022-12-30 RX ORDER — AMLODIPINE BESYLATE 5 MG/1
5 TABLET ORAL DAILY
Qty: 30 TABLET | Refills: 0 | Status: SHIPPED | OUTPATIENT
Start: 2022-12-30 | End: 2022-12-30 | Stop reason: SDUPTHER

## 2022-12-30 RX ORDER — AMLODIPINE BESYLATE 10 MG/1
5 TABLET ORAL
Status: DISCONTINUED | OUTPATIENT
Start: 2022-12-30 | End: 2022-12-30 | Stop reason: HOSPADM

## 2022-12-30 RX ORDER — AMLODIPINE BESYLATE 5 MG/1
5 TABLET ORAL DAILY
Qty: 30 TABLET | Refills: 0 | Status: SHIPPED | OUTPATIENT
Start: 2022-12-30 | End: 2023-01-04

## 2022-12-30 RX ORDER — CEPHALEXIN 500 MG/1
500 CAPSULE ORAL 4 TIMES DAILY
Qty: 28 CAPSULE | Refills: 0 | Status: SHIPPED | OUTPATIENT
Start: 2022-12-30 | End: 2022-12-30 | Stop reason: SDUPTHER

## 2022-12-30 ASSESSMENT — PAIN - FUNCTIONAL ASSESSMENT: PAIN_FUNCTIONAL_ASSESSMENT: NONE - DENIES PAIN

## 2022-12-30 NOTE — DISCHARGE INSTRUCTIONS
You should add the amlodipine daily to your regimen with your blood pressure medication. Check your blood pressure 2-3 times a week at different times during the day, keep a record of that and take it with you to see your primary care physician in follow-up. Follow a low-sodium diet. Return to the ED if worsening in any way. Drink plenty of water. Finish all of the Keflex. Urine was sent for culture and if it shows we need to change her antibiotics, we will call you and let you know.

## 2022-12-30 NOTE — ED PROVIDER NOTES
Emergency Department Provider Note                   PCP:                Neyda Herrera MD               Age: 80 y.o. Sex: female       ICD-10-CM    1. Primary hypertension  I10       2. Leukocytes in urine  R82.998           DISPOSITION Discharge - Pending Orders Complete 12/30/2022 04:51:24 PM       MDM     Amount and/or Complexity of Data Reviewed  Clinical lab tests: ordered and reviewed  Tests in the radiology section of CPT®: ordered and reviewed    Risk of Complications, Morbidity, and/or Mortality  Presenting problems: moderate  Diagnostic procedures: moderate  Management options: moderate  General comments: 4:22 PM Called lab, spoke with Mj Pathak regarding urine micro. They have urine and are starting micro. 4:52 PM urine shows trace leukocytes, will cover with Keflex for possible UTI. Urine was sent for culture. If it shows we need to change it we will call her. Patient has had visits to [address] [place] [address] within the last 4 to 6 weeks for the same complaint. Her blood pressure taken here is reassuring. She seems quite anxious about it. She does have a primary care physician to follow-up with. I did add amlodipine 5 mg daily as that is what they had wanted her on. I will give her 1 here now. Her family member is driving her home. She should drink plenty of water, follow a low-sodium diet and return to the ED if worsening in any way. Keep a check on her blood pressure, check 2-3 times a week at different times of the day and take with her when she sees her primary care physician in follow-up.     Patient Progress  Patient progress: stable                              Orders Placed This Encounter   Procedures    Culture, Urine    CT HEAD WO CONTRAST    CBC with Auto Differential    Protime-INR    Troponin    Comprehensive Metabolic Panel    Magnesium    Troponin    Urinalysis w rflx microscopic    Diet NPO    Cardiac Monitor - ED Only    Continuous Pulse Oximetry    Neuro checks    NIH STROKE SCALE ASSESSMENT    Weigh patient    Urine dipstick    Misc nursing order (specify)    POCT Glucose    POCT Urinalysis no Micro    EKG 12 Lead    Saline lock IV          Medications   amLODIPine (NORVASC) tablet 5 mg (has no administration in time range)       New Prescriptions    AMLODIPINE (NORVASC) 5 MG TABLET    Take 1 tablet by mouth daily    CEPHALEXIN (KEFLEX) 500 MG CAPSULE    Take 1 capsule by mouth 4 times daily for 7 days        Destiny Butler is a 80 y.o. female who presents to the Emergency Department with chief complaint of    Chief Complaint   Patient presents with    Hypertension      Patient is here with elevated blood pressure. She took her BP at home this am and it was \"over 200 over something so my daughter called the ambulance. \" She had not taken her BP medication yet. She takes Olmasartan and has been on it for 3 weeks. She took the medication this am at 8:30. Dr. Darylene Riddles is PCP. When BP is elevated, she has a headache and \"pressure on my eyes. \"     The history is provided by the patient. Hypertension    All other systems reviewed and are negative unless otherwise stated in the history of present illness section. Review of Systems    Past Medical History:   Diagnosis Date    Arrhythmia     pvc's controlled by medication    Arthritis     osteo    CAD (coronary artery disease) 2007    cardiac stent x1    GERD (gastroesophageal reflux disease)     mostly controlled by medication    Hypertension 2004    controlled by medication    IBS (irritable bowel syndrome)     Liver disease     yellow jaundice at age 15 no problems since    Psychiatric disorder     anxiety no treatment at present        Past Surgical History:   Procedure Laterality Date    CATARACT REMOVAL  1999    alessandro wtih IOlens implants    GYN      9 natural child births!     ORTHOPEDIC SURGERY  2005    alessandro carpal tunel    TX CARDIAC SURG PROCEDURE UNLIST  2007    cardiac stent x1    VASCULAR SURGERY  2003    laser treatment for vericose veins        Family History   Problem Relation Age of Onset    Hypertension Sister     Heart Disease Sister     Hypertension Sister     Hypertension Mother     Stroke Mother     Heart Disease Father     Heart Disease Sister         Social History     Socioeconomic History    Marital status:    Tobacco Use    Smoking status: Never    Smokeless tobacco: Never   Substance and Sexual Activity    Alcohol use: No     Alcohol/week: 0.0 standard drinks    Drug use: Yes     Types: Prescription, OTC   Social History Narrative    2/5/17:  PATIENT IS , LIVES ALONE. Allergies: Atorvastatin, Erythromycin, and Codeine    Previous Medications    ASPIRIN 81 MG EC TABLET    Take 81 mg by mouth    CALCIUM CARBONATE (OS-SUKHDEV) 1250 (500 CA) MG CHEWABLE TABLET    Take 1 tablet by mouth daily    ESTRADIOL (ESTRACE) 0.1 MG/GM VAGINAL CREAM    Place 2 g vaginally daily    HYDROCHLOROTHIAZIDE (HYDRODIURIL) 25 MG TABLET    Take 25 mg by mouth daily    METOPROLOL SUCCINATE (TOPROL XL) 50 MG EXTENDED RELEASE TABLET    Take 50 mg by mouth daily    MOMETASONE (ELOCON) 0.1 % CREAM    Apply topically    OLMESARTAN (BENICAR) 40 MG TABLET    Take by mouth daily    POTASSIUM CHLORIDE (KLOR-CON M) 10 MEQ EXTENDED RELEASE TABLET    TAKE 1 TABLET EVERY DAY    PRAVASTATIN (PRAVACHOL) 40 MG TABLET    Take 40 mg by mouth    SERTRALINE (ZOLOFT) 50 MG TABLET    Take by mouth daily    VITAMIN D 25 MCG (1000 UT) CAPS    Take 1,000 Units by mouth 2 times daily        Vitals signs and nursing note reviewed. No data found.          Physical Exam     Procedures    ED EKG Interpretation  EKG was interpreted in the absence of a cardiologist.    Rate: 78 bpm.  EKG Interpretation: EKG Interpretation: sinus rhythm  ST Segments: Normal ST segments - NO STEMI    Results for orders placed or performed during the hospital encounter of 12/30/22   CT HEAD WO CONTRAST    Narrative    Head CT    INDICATION: Hypertension and headache for one week    Multiple axial images obtained through the brain without intravenous contrast.   Radiation dose reduction techniques were used for this study: All CT scans  performed at this facility use one or all of the following: Automated exposure  control, adjustment of the mA and/or kVp according to patient's size, iterative  reconstruction. Compared with 02/04/2017. FINDINGS: No areas of abnormal attenuation are seen in the brain. There is no CT  evidence of acute hemorrhage or infarction. The ventricles are normal in size. There are no extra-axial fluid collections. No masses are seen. There are fluid  levels in both maxillary sinuses. There are no bony lesions. Impression    1. No CT evidence of acute intracranial abnormality. 2.  Fluid levels in both maxillary sinuses, possible acute sinusitis.      CBC with Auto Differential   Result Value Ref Range    WBC 11.8 (H) 4.3 - 11.1 K/uL    RBC 4.09 4.05 - 5.2 M/uL    Hemoglobin 12.3 11.7 - 15.4 g/dL    Hematocrit 35.7 (L) 35.8 - 46.3 %    MCV 87.3 82 - 102 FL    MCH 30.1 26.1 - 32.9 PG    MCHC 34.5 31.4 - 35.0 g/dL    RDW 12.9 11.9 - 14.6 %    Platelets 929 644 - 627 K/uL    MPV 8.6 (L) 9.4 - 12.3 FL    nRBC 0.00 0.0 - 0.2 K/uL    Differential Type AUTOMATED      Seg Neutrophils 75 43 - 78 %    Lymphocytes 14 13 - 44 %    Monocytes 9 4.0 - 12.0 %    Eosinophils % 0 (L) 0.5 - 7.8 %    Basophils 1 0.0 - 2.0 %    Immature Granulocytes 1 0.0 - 5.0 %    Segs Absolute 9.0 (H) 1.7 - 8.2 K/UL    Absolute Lymph # 1.7 0.5 - 4.6 K/UL    Absolute Mono # 1.0 0.1 - 1.3 K/UL    Absolute Eos # 0.0 0.0 - 0.8 K/UL    Basophils Absolute 0.1 0.0 - 0.2 K/UL    Absolute Immature Granulocyte 0.1 0.0 - 0.5 K/UL   Protime-INR   Result Value Ref Range    Protime 12.9 12.6 - 14.3 sec    INR 0.9     Troponin   Result Value Ref Range    Troponin, High Sensitivity 61.6 (H) 0 - 14 pg/mL   Comprehensive Metabolic Panel   Result Value Ref Range    Sodium 135 133 - 143 mmol/L    Potassium 3.3 (L) 3.5 - 5.1 mmol/L    Chloride 100 (L) 101 - 110 mmol/L    CO2 26 21 - 32 mmol/L    Anion Gap 9 2 - 11 mmol/L    Glucose 136 (H) 65 - 100 mg/dL    BUN 7 (L) 8 - 23 MG/DL    Creatinine 0.80 0.6 - 1.0 MG/DL    Est, Glom Filt Rate >60 >60 ml/min/1.73m2    Calcium 8.0 (L) 8.3 - 10.4 MG/DL    Total Bilirubin 0.3 0.2 - 1.1 MG/DL    ALT 23 12 - 65 U/L    AST 20 15 - 37 U/L    Alk Phosphatase 70 50 - 136 U/L    Total Protein 7.2 6.3 - 8.2 g/dL    Albumin 3.4 3.2 - 4.6 g/dL    Globulin 3.8 2.8 - 4.5 g/dL    Albumin/Globulin Ratio 0.9 0.4 - 1.6     Magnesium   Result Value Ref Range    Magnesium 1.9 1.8 - 2.4 mg/dL   Troponin   Result Value Ref Range    Troponin, High Sensitivity 59.2 (H) 0 - 14 pg/mL   Urinalysis w rflx microscopic   Result Value Ref Range    Color, UA YELLOW/STRAW      Appearance CLEAR      Specific Gravity, UA 1.009 1.001 - 1.023      pH, Urine 7.0 5.0 - 9.0      Protein, UA Negative NEG mg/dL    Glucose, UA Negative mg/dL    Ketones, Urine 15 (A) NEG mg/dL    Bilirubin Urine Negative NEG      Blood, Urine Negative NEG      Urobilinogen, Urine 0.2 0.2 - 1.0 EU/dL    Nitrite, Urine Negative NEG      Leukocyte Esterase, Urine TRACE (A) NEG     POCT Urinalysis no Micro   Result Value Ref Range    Specific Gravity, Urine, POC 1.015 1.001 - 1.023      pH, Urine, POC 7.0 5.0 - 9.0      Protein, Urine, POC Negative NEG mg/dL    Glucose, UA POC Negative NEG mg/dL    Ketones, Urine, POC 15 (A) NEG mg/dL    Bilirubin, Urine, POC Negative NEG      Blood, UA POC Trace Intact (A) NEG      URINE UROBILINOGEN POC 0.2 0.2 - 1.0 EU/dL    Nitrate, Urine, POC Negative NEG      Leukocyte Est, UA POC TRACE (A) NEG      Performed by:  Sharmin Johnson    EKG 12 Lead   Result Value Ref Range    Ventricular Rate 78 BPM    Atrial Rate 78 BPM    P-R Interval 208 ms    QRS Duration 78 ms    Q-T Interval 400 ms    QTc Calculation (Bazett) 456 ms    P Axis 55 degrees    R Axis -13 degrees    T Axis 20 degrees    Diagnosis Normal sinus rhythm         CT HEAD WO CONTRAST   Final Result   1. No CT evidence of acute intracranial abnormality. 2.  Fluid levels in both maxillary sinuses, possible acute sinusitis. Voice dictation software was used during the making of this note. This software is not perfect and grammatical and other typographical errors may be present. This note has not been completely proofread for errors.      FAWN Casiano  12/30/22 [id number]

## 2022-12-30 NOTE — ED NOTES
I have reviewed discharge instructions with the patient and caregiver. The patient and caregiver verbalized understanding. Patient left ED via Discharge Method: ambulatory to Home with family. Opportunity for questions and clarification provided. Patient given 2 scripts. To continue your aftercare when you leave the hospital, you may receive an automated call from our care team to check in on how you are doing. This is a free service and part of our promise to provide the best care and service to meet your aftercare needs.  If you have questions, or wish to unsubscribe from this service please call [phone]. Thank you for Choosing our [facility] Emergency Department.         Jane Mejia RN  12/30/22 [id number]

## 2022-12-30 NOTE — ED TRIAGE NOTES
Patient arrives to ED via EMS from home. Patient reports hypertension and headache x 1 week. Patient denies blurry vision. Patient given 1 nitro in route and nitro paste. Patient's initial /110. Patient's BP when arriving to /80. Denies chest pain.

## 2023-01-01 LAB
BACTERIA SPEC CULT: NORMAL
SERVICE CMNT-IMP: NORMAL

## 2023-01-02 LAB
BACTERIA SPEC CULT: NORMAL
SERVICE CMNT-IMP: NORMAL

## 2023-01-04 ENCOUNTER — OFFICE VISIT (OUTPATIENT)
Dept: CARDIOLOGY CLINIC | Age: 88
End: 2023-01-04
Payer: COMMERCIAL

## 2023-01-04 ENCOUNTER — HOME HEALTH ADMISSION (OUTPATIENT)
Dept: HOME HEALTH SERVICES | Facility: HOME HEALTH | Age: 88
End: 2023-01-04
Payer: MEDICARE

## 2023-01-04 VITALS
BODY MASS INDEX: 22.47 KG/M2 | DIASTOLIC BLOOD PRESSURE: 62 MMHG | HEIGHT: 61 IN | HEART RATE: 72 BPM | SYSTOLIC BLOOD PRESSURE: 126 MMHG | WEIGHT: 119 LBS

## 2023-01-04 DIAGNOSIS — Z91.14 POOR COMPLIANCE WITH MEDICATION: ICD-10-CM

## 2023-01-04 DIAGNOSIS — I65.23 BILATERAL CAROTID ARTERY STENOSIS: ICD-10-CM

## 2023-01-04 DIAGNOSIS — I10 ESSENTIAL HYPERTENSION: ICD-10-CM

## 2023-01-04 DIAGNOSIS — Z98.61 S/P PTCA (PERCUTANEOUS TRANSLUMINAL CORONARY ANGIOPLASTY): ICD-10-CM

## 2023-01-04 DIAGNOSIS — E78.2 MIXED HYPERLIPIDEMIA: ICD-10-CM

## 2023-01-04 DIAGNOSIS — I25.10 CORONARY ARTERY DISEASE INVOLVING NATIVE CORONARY ARTERY OF NATIVE HEART WITHOUT ANGINA PECTORIS: Primary | ICD-10-CM

## 2023-01-04 PROBLEM — Z91.148 POOR COMPLIANCE WITH MEDICATION: Status: ACTIVE | Noted: 2023-01-04

## 2023-01-04 PROCEDURE — 1123F ACP DISCUSS/DSCN MKR DOCD: CPT | Performed by: INTERNAL MEDICINE

## 2023-01-04 PROCEDURE — 99214 OFFICE O/P EST MOD 30 MIN: CPT | Performed by: INTERNAL MEDICINE

## 2023-01-04 RX ORDER — NIFEDIPINE 30 MG/1
30 TABLET, FILM COATED, EXTENDED RELEASE ORAL DAILY
COMMUNITY

## 2023-01-04 RX ORDER — METOPROLOL TARTRATE 50 MG/1
50 TABLET, FILM COATED ORAL 2 TIMES DAILY
COMMUNITY

## 2023-01-04 RX ORDER — OMEPRAZOLE 20 MG/1
20 CAPSULE, DELAYED RELEASE ORAL DAILY
COMMUNITY

## 2023-01-04 RX ORDER — TURMERIC 400 MG
CAPSULE ORAL
COMMUNITY

## 2023-01-04 ASSESSMENT — ENCOUNTER SYMPTOMS
CONSTIPATION: 0
ABDOMINAL PAIN: 0
COUGH: 0
ABDOMINAL DISTENTION: 0
SHORTNESS OF BREATH: 0
SORE THROAT: 0
PHOTOPHOBIA: 0
DIARRHEA: 0

## 2023-01-04 NOTE — PROGRESS NOTES
[facility] CARDIOLOGY  [address] [facility], [address]  PHONE: [phone]        NAME:  Airam Gray  : 1935  MRN: [id number]     PCP:  Martha Fall MD      SUBJECTIVE:   Airam Gray is a 80 y.o. female seen for a follow up visit regarding the following:     Chief Complaint   Patient presents with    Coronary Artery Disease    Hypertension       HPI:  Last seen in     She has been having wildly labile blood pressures recently and headaches. She was evaluated at [place] in  of this year with carotid duplex and echo which were both benign. She has potentially been taking her medications wrong, underdosing and overdosing with wildly labile blood pressures. Consulting home health for assistance with medication compliance and occupational/physical therapy as needed. I discussed her medication regimen and a healthy diet and lifestyle with routine blood pressure checks each afternoon after 5 minutes of rest and relaxation with her and her family today. She will bring a 2 to 3-week blood pressure log for my evaluation and we will adjust meds as needed. Otherwise doing well since last visit without interval angina, CHF, palpitations, edema, presyncope or syncope. Vitals otherwise controlled and tolerating meds well. Staying active for her age without any significant limitations. Past Medical History, Past Surgical History, Family history, Social History, and Medications were all reviewed with the patient today and updated as necessary.      Current Outpatient Medications   Medication Sig Dispense Refill    metoprolol tartrate (LOPRESSOR) 50 MG tablet Take 50 mg by mouth 2 times daily      NIFEdipine (ADALAT CC) 30 MG extended release tablet Take 30 mg by mouth daily      omeprazole (PRILOSEC) 20 MG delayed release capsule Take 20 mg by mouth daily      Turmeric 400 MG CAPS Take by mouth      aspirin 81 MG EC tablet Take 81 mg by mouth      calcium carbonate (OS-SUKHDEV) 1250 (500 Ca) MG chewable tablet Take 1 tablet by mouth daily      vitamin D 25 MCG (1000 UT) CAPS Take 1,000 Units by mouth 2 times daily      olmesartan (BENICAR) 40 MG tablet Take by mouth daily      pravastatin (PRAVACHOL) 40 MG tablet Take 40 mg by mouth      sertraline (ZOLOFT) 50 MG tablet Take by mouth daily      cephALEXin (KEFLEX) 500 MG capsule Take 1 capsule by mouth 4 times daily for 7 days 28 capsule 0     No current facility-administered medications for this visit. Allergies   Allergen Reactions    Atorvastatin Other (See Comments)    Erythromycin Diarrhea     Nausea and vomiting    Codeine Nausea And Vomiting     Light headed and faint       Patient Active Problem List    Diagnosis Date Noted    Hypertensive urgency 02/05/2017    S/P PTCA (percutaneous transluminal coronary angioplasty) 08/17/2016    Essential hypertension 10/15/2015    Depression with anxiety 10/15/2015    Carotid artery stenosis 07/12/2010     Overview Note:     s/p left CEA, worsening Right ICA disease by duplex 11/2012        GERD (gastroesophageal reflux disease) 07/12/2010    CAD (coronary artery disease) 07/12/2010     Overview Note:     -native vessel         Hyperlipidemia 07/12/2010        Past Surgical History:   Procedure Laterality Date    CATARACT REMOVAL  1999    alessandro wtih IOlens implants    GYN      9 natural child births!     ORTHOPEDIC SURGERY  2005    alessandro carpal tunel    AR UNLISTED PROCEDURE CARDIAC SURGERY  2007    cardiac stent x1    VASCULAR SURGERY  2003    laser treatment for vericose veins       Family History   Problem Relation Age of Onset    Hypertension Sister     Heart Disease Sister     Hypertension Sister     Hypertension Mother     Stroke Mother     Heart Disease Father     Heart Disease Sister         Social History     Tobacco Use    Smoking status: Never    Smokeless tobacco: Never   Substance Use Topics    Alcohol use: No     Alcohol/week: 0.0 standard drinks ROS:    Review of Systems   Constitutional:  Negative for appetite change, chills, diaphoresis and fatigue. Head fullness   HENT:  Negative for congestion, mouth sores, nosebleeds, sore throat and tinnitus. Eyes:  Negative for photophobia and visual disturbance. Respiratory:  Negative for cough and shortness of breath. Cardiovascular:  Negative for chest pain, palpitations and leg swelling. Gastrointestinal:  Negative for abdominal distention, abdominal pain, constipation and diarrhea. Endocrine: Negative for cold intolerance, heat intolerance, polydipsia and polyuria. Genitourinary:  Negative for dysuria and hematuria. Musculoskeletal:  Negative for arthralgias, joint swelling and myalgias. Skin:  Negative for rash. Allergic/Immunologic: Negative for environmental allergies and food allergies. Neurological:  Negative for dizziness, seizures, syncope and light-headedness. Hematological:  Negative for adenopathy. Does not bruise/bleed easily. Psychiatric/Behavioral:  Negative for agitation, behavioral problems, dysphoric mood and hallucinations. The patient is not nervous/anxious. PHYSICAL EXAM:     Vitals:    01/04/23 1416   BP: 126/62   Pulse: 72   Weight: 119 lb (54 kg)   Height: 5' 1\" (1.549 m)      Wt Readings from Last 3 Encounters:   01/04/23 119 lb (54 kg)      BP Readings from Last 3 Encounters:   01/04/23 126/62        Physical Exam  Constitutional:       Appearance: Normal appearance. She is normal weight. HENT:      Head: Normocephalic and atraumatic. Nose: Nose normal.      Mouth/Throat:      Mouth: Mucous membranes are moist.      Pharynx: Oropharynx is clear. Eyes:      Extraocular Movements: Extraocular movements intact. Pupils: Pupils are equal, round, and reactive to light. Neck:      Vascular: No carotid bruit or JVD. Cardiovascular:      Rate and Rhythm: Normal rate and regular rhythm. Heart sounds: No murmur heard.     No friction rub. No gallop. Pulmonary:      Effort: Pulmonary effort is normal.      Breath sounds: Normal breath sounds. No wheezing or rhonchi. Abdominal:      General: Abdomen is flat. Bowel sounds are normal. There is no distension. Palpations: Abdomen is soft. Tenderness: There is no abdominal tenderness. Musculoskeletal:         General: No swelling. Normal range of motion. Cervical back: Normal range of motion and neck supple. No tenderness. Skin:     General: Skin is warm and dry. Neurological:      General: No focal deficit present. Mental Status: She is alert and oriented to person, place, and time. Mental status is at baseline. Psychiatric:         Mood and Affect: Mood normal.         Behavior: Behavior normal.        Medical problems and test results were reviewed with the patient today. No results found for: CHOL  No results found for: TRIG  No results found for: HDL  No results found for: LDLCHOLESTEROL, LDLCALC  No results found for: LABVLDL, VLDL  No results found for: [facility]     Lab Results   Component Value Date/Time     12/30/2022 01:29 PM    K 3.3 12/30/2022 01:29 PM     12/30/2022 01:29 PM    CO2 26 12/30/2022 01:29 PM    BUN 7 12/30/2022 01:29 PM    CREATININE 0.80 12/30/2022 01:29 PM    GLUCOSE 136 12/30/2022 01:29 PM    CALCIUM 8.0 12/30/2022 01:29 PM         Recent Labs     12/30/22  1329   WBC 11.8*   HGB 12.3   HCT 35.7*   MCV 87.3           No results found for: LABA1C  No results found for: EAG     No results found for: BNP     No results found for: TSHFT4, TSH     No results found for any visits on 01/04/23. ASSESSMENT and PLAN    Diagnoses and all orders for this visit:      Essential hypertension with goal blood pressure less than 140/90-see below.       Bilateral carotid artery stenosis- s/p left CEA- clinically asymptomatic- surveillance duplex at [facility] in June looks great, recheck in 2 to 3 years      Coronary artery disease involving native coronary artery of native heart without angina pectoris- stable, no angina, continue meds. Echo at [facility] in June looks good. Gastroesophageal reflux disease without esophagitis- stable, no discomfort, continue meds. S/P PTCA/STENT- stable, no angina, continue meds. Exercise as tolerated, continue with low salt diet. Mixed hyperlipidemia- stable, getting labs with Dr. Mohinder Engel regularly, continue meds. Depression with anxiety- s/p death of her , greatly improved, resolved now. Improved since last visit. Plan:  Stay well-hydrated, minimize added sodium  Continue current meds and check daily afternoon resting blood pressure and heart rate for a few weeks  Reassess at follow-up and titrate meds as needed based upon results               Return in about 2 weeks (around 1/18/2023).          Jadiel Schrader MD  1/4/2023  2:43 PM

## 2023-01-06 ENCOUNTER — HOME CARE VISIT (OUTPATIENT)
Dept: HOME HEALTH SERVICES | Facility: HOME HEALTH | Age: 88
End: 2023-01-06

## 2023-01-06 ENCOUNTER — HOME CARE VISIT (OUTPATIENT)
Dept: SCHEDULING | Facility: HOME HEALTH | Age: 88
End: 2023-01-06

## 2023-01-06 VITALS
TEMPERATURE: 97.1 F | DIASTOLIC BLOOD PRESSURE: 70 MMHG | SYSTOLIC BLOOD PRESSURE: 142 MMHG | HEART RATE: 69 BPM | OXYGEN SATURATION: 98 % | RESPIRATION RATE: 16 BRPM

## 2023-01-06 PROCEDURE — 0221000100 HH NO PAY CLAIM PROCEDURE

## 2023-01-06 PROCEDURE — G0162 HHC RN E&M PLAN SVS, 15 MIN: HCPCS

## 2023-01-06 ASSESSMENT — ENCOUNTER SYMPTOMS
STOOL DESCRIPTION: SOFT FORMED
PAIN LOCATION - PAIN QUALITY: ACHING
DYSPNEA ACTIVITY LEVEL: AFTER AMBULATING MORE THAN 20 FT

## 2023-01-09 ENCOUNTER — HOME CARE VISIT (OUTPATIENT)
Dept: HOME HEALTH SERVICES | Facility: HOME HEALTH | Age: 88
End: 2023-01-09
Payer: MEDICARE

## 2023-01-09 ENCOUNTER — HOME CARE VISIT (OUTPATIENT)
Dept: SCHEDULING | Facility: HOME HEALTH | Age: 88
End: 2023-01-09
Payer: MEDICARE

## 2023-01-09 VITALS
OXYGEN SATURATION: 100 % | SYSTOLIC BLOOD PRESSURE: 148 MMHG | TEMPERATURE: 98.5 F | DIASTOLIC BLOOD PRESSURE: 84 MMHG | HEART RATE: 69 BPM | RESPIRATION RATE: 16 BRPM

## 2023-01-09 PROCEDURE — G0299 HHS/HOSPICE OF RN EA 15 MIN: HCPCS

## 2023-01-09 ASSESSMENT — ENCOUNTER SYMPTOMS
STOOL DESCRIPTION: SOFT FORMED
DYSPNEA ACTIVITY LEVEL: AFTER AMBULATING MORE THAN 20 FT
PAIN LOCATION - PAIN QUALITY: ACHING

## 2023-01-11 ENCOUNTER — HOME CARE VISIT (OUTPATIENT)
Dept: SCHEDULING | Facility: HOME HEALTH | Age: 88
End: 2023-01-11
Payer: MEDICARE

## 2023-01-11 VITALS
TEMPERATURE: 97.8 F | DIASTOLIC BLOOD PRESSURE: 70 MMHG | HEART RATE: 74 BPM | RESPIRATION RATE: 18 BRPM | SYSTOLIC BLOOD PRESSURE: 134 MMHG | OXYGEN SATURATION: 97 %

## 2023-01-11 PROCEDURE — G0299 HHS/HOSPICE OF RN EA 15 MIN: HCPCS

## 2023-01-11 ASSESSMENT — ENCOUNTER SYMPTOMS
DYSPNEA ACTIVITY LEVEL: AFTER AMBULATING MORE THAN 20 FT
STOOL DESCRIPTION: FORMED
HEMOPTYSIS: 0

## 2023-01-13 ENCOUNTER — HOME CARE VISIT (OUTPATIENT)
Dept: SCHEDULING | Facility: HOME HEALTH | Age: 88
End: 2023-01-13
Payer: MEDICARE

## 2023-01-13 VITALS
OXYGEN SATURATION: 98 % | DIASTOLIC BLOOD PRESSURE: 78 MMHG | TEMPERATURE: 97.8 F | SYSTOLIC BLOOD PRESSURE: 138 MMHG | HEART RATE: 69 BPM | RESPIRATION RATE: 18 BRPM

## 2023-01-13 PROCEDURE — G0299 HHS/HOSPICE OF RN EA 15 MIN: HCPCS

## 2023-01-13 ASSESSMENT — ENCOUNTER SYMPTOMS
STOOL DESCRIPTION: FORMED
HEMOPTYSIS: 0

## 2023-01-14 ASSESSMENT — ENCOUNTER SYMPTOMS
SHORTNESS OF BREATH: 0
DIARRHEA: 0
ABDOMINAL PAIN: 0
COUGH: 0
ABDOMINAL DISTENTION: 0
SORE THROAT: 0
PHOTOPHOBIA: 0
CONSTIPATION: 0

## 2023-01-14 NOTE — PROGRESS NOTES
[facility] CARDIOLOGY  [address] [facility], [address]  PHONE: [phone]        NAME:  Awa Dooley  : 1935  MRN: [id number]     PCP:  Deena Almeida MD      SUBJECTIVE:   Awa Dooley is a 80 y.o. female seen for a follow up visit regarding the following:     Chief Complaint   Patient presents with    Hypertension         HPI:     She has been having wildly labile blood pressures recently and headaches. She was evaluated at [place] in  of this year with carotid duplex and echo which were both benign. She has potentially been taking her medications wrong, underdosing and overdosing with wildly labile blood pressures. Consulting home health for assistance with medication compliance and occupational/physical therapy as needed. I discussed her medication regimen and a healthy diet and lifestyle with routine blood pressure checks each afternoon after 5 minutes of rest and relaxation with her and her family today. She brings a 2 to 3-week blood pressure log for my evaluation systolics ranging from [id number] but usually in the 130 to 140 mmHg range. Otherwise doing well since last visit without interval angina, CHF, palpitations, edema, presyncope or syncope. Vitals otherwise controlled and tolerating meds well. Staying active for her age without any significant limitations. Past Medical History, Past Surgical History, Family history, Social History, and Medications were all reviewed with the patient today and updated as necessary. Current Outpatient Medications   Medication Sig Dispense Refill    NIFEdipine (PROCARDIA XL) 60 MG extended release tablet Take 1 tablet by mouth daily 30 tablet 5    L-Lysine 1000 MG TABS Take 1 capsule by mouth daily. Olive Leaf Extract 150 MG CAPS Take 1 capsule by mouth daily. Magnesium 300 MG CAPS Take 1 capsule by mouth daily. ELDERBERRY PO Take 1 capsule by mouth daily.       l-arginine 500 MG capsule Take 500 mg by mouth daily. metoprolol tartrate (LOPRESSOR) 50 MG tablet Take 50 mg by mouth 2 times daily      omeprazole (PRILOSEC) 20 MG delayed release capsule Take 20 mg by mouth daily      Turmeric 400 MG CAPS Take 400 mg by mouth daily      aspirin 81 MG EC tablet Take 81 mg by mouth daily      vitamin D 25 MCG (1000 UT) CAPS Take 1,000 Units by mouth 2 times daily      olmesartan (BENICAR) 40 MG tablet Take 40 mg by mouth daily      pravastatin (PRAVACHOL) 40 MG tablet Take 40 mg by mouth daily      sertraline (ZOLOFT) 50 MG tablet Take 50 mg by mouth daily      calcium carbonate (OS-SUKHDEV) 1250 (500 Ca) MG chewable tablet Take 1 tablet by mouth daily (Patient not taking: Reported on 1/18/2023)       No current facility-administered medications for this visit. Allergies   Allergen Reactions    Amlodipine      Diarrhea, vomitting     Atorvastatin Other (See Comments)    Erythromycin Diarrhea     Nausea and vomiting    Codeine Nausea And Vomiting     Light headed and faint       Patient Active Problem List    Diagnosis Date Noted    Poor compliance with medication 01/04/2023     Priority: Medium    Hypertensive urgency 02/05/2017     Priority: Low    S/P PTCA (percutaneous transluminal coronary angioplasty) 08/17/2016     Priority: Low    Essential hypertension 10/15/2015     Priority: Low    Depression with anxiety 10/15/2015     Priority: Low    Carotid artery stenosis 07/12/2010     Priority: Low     Overview Note:     s/p left CEA, worsening Right ICA disease by duplex 11/2012        GERD (gastroesophageal reflux disease) 07/12/2010     Priority: Low    CAD (coronary artery disease) 07/12/2010     Priority: Low     Overview Note:     -native vessel         Hyperlipidemia 07/12/2010     Priority: Low        Past Surgical History:   Procedure Laterality Date    CATARACT REMOVAL  1999    alessandro wtih IOlens implants    GYN      9 natural child births!     ORTHOPEDIC SURGERY  2005    alessandro carpal tunel    NE UNLISTED PROCEDURE CARDIAC SURGERY  2007    cardiac stent x1    VASCULAR SURGERY  2003    laser treatment for vericose veins       Family History   Problem Relation Age of Onset    Hypertension Sister     Heart Disease Sister     Hypertension Sister     Hypertension Mother     Stroke Mother     Heart Disease Father     Heart Disease Sister         Social History     Tobacco Use    Smoking status: Never    Smokeless tobacco: Never   Substance Use Topics    Alcohol use: No     Alcohol/week: 0.0 standard drinks       ROS:    Review of Systems   Constitutional:  Negative for appetite change, chills, diaphoresis and fatigue. Head fullness   HENT:  Negative for congestion, mouth sores, nosebleeds, sore throat and tinnitus. Eyes:  Negative for photophobia and visual disturbance. Respiratory:  Negative for cough and shortness of breath. Cardiovascular:  Negative for chest pain, palpitations and leg swelling. Gastrointestinal:  Negative for abdominal distention, abdominal pain, constipation and diarrhea. Endocrine: Negative for cold intolerance, heat intolerance, polydipsia and polyuria. Genitourinary:  Negative for dysuria and hematuria. Musculoskeletal:  Negative for arthralgias, joint swelling and myalgias. Skin:  Negative for rash. Allergic/Immunologic: Negative for environmental allergies and food allergies. Neurological:  Negative for dizziness, seizures, syncope and light-headedness. Hematological:  Negative for adenopathy. Does not bruise/bleed easily. Psychiatric/Behavioral:  Negative for agitation, behavioral problems, dysphoric mood and hallucinations. The patient is not nervous/anxious.        PHYSICAL EXAM:     Vitals:    01/18/23 1544 01/18/23 1559   BP: (!) 178/82 (!) 160/82   Pulse: 72    Weight: 120 lb (54.4 kg)    Height: 5' 1\" (1.549 m)         Wt Readings from Last 3 Encounters:   01/18/23 120 lb (54.4 kg)   01/04/23 119 lb (54 kg)      BP Readings from Last 3 Encounters: 01/18/23 (!) 160/82   01/17/23 138/74   01/13/23 138/78        Physical Exam  Constitutional:       Appearance: Normal appearance. She is normal weight. HENT:      Head: Normocephalic and atraumatic. Nose: Nose normal.      Mouth/Throat:      Mouth: Mucous membranes are moist.      Pharynx: Oropharynx is clear. Eyes:      Extraocular Movements: Extraocular movements intact. Pupils: Pupils are equal, round, and reactive to light. Neck:      Vascular: No carotid bruit or JVD. Cardiovascular:      Rate and Rhythm: Normal rate and regular rhythm. Heart sounds: No murmur heard. No friction rub. No gallop. Pulmonary:      Effort: Pulmonary effort is normal.      Breath sounds: Normal breath sounds. No wheezing or rhonchi. Abdominal:      General: Abdomen is flat. Bowel sounds are normal. There is no distension. Palpations: Abdomen is soft. Tenderness: There is no abdominal tenderness. Musculoskeletal:         General: No swelling. Normal range of motion. Cervical back: Normal range of motion and neck supple. No tenderness. Skin:     General: Skin is warm and dry. Neurological:      General: No focal deficit present. Mental Status: She is alert and oriented to person, place, and time. Mental status is at baseline. Psychiatric:         Mood and Affect: Mood normal.         Behavior: Behavior normal.        Medical problems and test results were reviewed with the patient today.        No results found for: CHOL  No results found for: TRIG  No results found for: HDL  No results found for: LDLCHOLESTEROL, LDLCALC  No results found for: LABVLDL, VLDL  No results found for: [facility]     Lab Results   Component Value Date/Time     12/30/2022 01:29 PM    K 3.3 12/30/2022 01:29 PM     12/30/2022 01:29 PM    CO2 26 12/30/2022 01:29 PM    BUN 7 12/30/2022 01:29 PM    CREATININE 0.80 12/30/2022 01:29 PM    GLUCOSE 136 12/30/2022 01:29 PM    CALCIUM 8.0 12/30/2022 01:29 PM         Recent Labs     12/30/22  1329   WBC 11.8*   HGB 12.3   HCT 35.7*   MCV 87.3           No results found for: LABA1C  No results found for: EAG     No results found for: BNP     No results found for: TSHFT4, TSH     No results found for any visits on 01/18/23. ASSESSMENT and PLAN    Diagnoses and all orders for this visit:      Essential hypertension with goal blood pressure less than 140/90-see below. Bilateral carotid artery stenosis- s/p left CEA- clinically asymptomatic- surveillance duplex at [facility] in June looks great, recheck in 2 to 3 years      Coronary artery disease involving native coronary artery of native heart without angina pectoris- stable, no angina, continue meds. Echo at [facility] in June looks good. Gastroesophageal reflux disease without esophagitis- stable, no discomfort, continue meds. S/P PTCA/STENT- stable, no angina, continue meds. Exercise as tolerated, continue with low salt diet. Mixed hyperlipidemia- stable, getting labs with Dr. Jarrett Gates regularly, continue meds. Depression with anxiety- s/p death of her , greatly improved, resolved now. Improved since last visit. Plan:  Increase Adalat to 60mg daily, call me in a couple of weeks with home BP's. My sodium and elevate legs when resting. Return in about 6 months (around 7/18/2023).          Kathy Foy MD  1/18/2023  4:08 PM

## 2023-01-17 ENCOUNTER — HOME CARE VISIT (OUTPATIENT)
Dept: SCHEDULING | Facility: HOME HEALTH | Age: 88
End: 2023-01-17
Payer: MEDICARE

## 2023-01-17 VITALS
HEART RATE: 74 BPM | DIASTOLIC BLOOD PRESSURE: 74 MMHG | TEMPERATURE: 97.8 F | SYSTOLIC BLOOD PRESSURE: 138 MMHG | OXYGEN SATURATION: 97 % | RESPIRATION RATE: 18 BRPM

## 2023-01-17 PROCEDURE — G0299 HHS/HOSPICE OF RN EA 15 MIN: HCPCS

## 2023-01-17 ASSESSMENT — ENCOUNTER SYMPTOMS
STOOL DESCRIPTION: FORMED
HEMOPTYSIS: 0

## 2023-01-18 ENCOUNTER — OFFICE VISIT (OUTPATIENT)
Dept: CARDIOLOGY CLINIC | Age: 88
End: 2023-01-18
Payer: COMMERCIAL

## 2023-01-18 VITALS
SYSTOLIC BLOOD PRESSURE: 160 MMHG | HEIGHT: 61 IN | WEIGHT: 120 LBS | DIASTOLIC BLOOD PRESSURE: 82 MMHG | BODY MASS INDEX: 22.66 KG/M2 | HEART RATE: 72 BPM

## 2023-01-18 DIAGNOSIS — I10 ESSENTIAL HYPERTENSION: Primary | ICD-10-CM

## 2023-01-18 DIAGNOSIS — I65.23 BILATERAL CAROTID ARTERY STENOSIS: ICD-10-CM

## 2023-01-18 DIAGNOSIS — Z98.61 S/P PTCA (PERCUTANEOUS TRANSLUMINAL CORONARY ANGIOPLASTY): ICD-10-CM

## 2023-01-18 DIAGNOSIS — I25.10 CORONARY ARTERY DISEASE INVOLVING NATIVE CORONARY ARTERY OF NATIVE HEART WITHOUT ANGINA PECTORIS: ICD-10-CM

## 2023-01-18 DIAGNOSIS — E78.2 MIXED HYPERLIPIDEMIA: ICD-10-CM

## 2023-01-18 PROCEDURE — 1090F PRES/ABSN URINE INCON ASSESS: CPT | Performed by: INTERNAL MEDICINE

## 2023-01-18 PROCEDURE — G8427 DOCREV CUR MEDS BY ELIG CLIN: HCPCS | Performed by: INTERNAL MEDICINE

## 2023-01-18 PROCEDURE — 1036F TOBACCO NON-USER: CPT | Performed by: INTERNAL MEDICINE

## 2023-01-18 PROCEDURE — 99214 OFFICE O/P EST MOD 30 MIN: CPT | Performed by: INTERNAL MEDICINE

## 2023-01-18 PROCEDURE — G8484 FLU IMMUNIZE NO ADMIN: HCPCS | Performed by: INTERNAL MEDICINE

## 2023-01-18 PROCEDURE — G8420 CALC BMI NORM PARAMETERS: HCPCS | Performed by: INTERNAL MEDICINE

## 2023-01-18 PROCEDURE — 1123F ACP DISCUSS/DSCN MKR DOCD: CPT | Performed by: INTERNAL MEDICINE

## 2023-01-18 RX ORDER — NIFEDIPINE 60 MG/1
60 TABLET, EXTENDED RELEASE ORAL DAILY
Qty: 30 TABLET | Refills: 5 | Status: SHIPPED | OUTPATIENT
Start: 2023-01-18

## 2023-01-19 ENCOUNTER — HOME CARE VISIT (OUTPATIENT)
Dept: SCHEDULING | Facility: HOME HEALTH | Age: 88
End: 2023-01-19
Payer: MEDICARE

## 2023-01-19 VITALS
HEART RATE: 66 BPM | DIASTOLIC BLOOD PRESSURE: 68 MMHG | OXYGEN SATURATION: 95 % | SYSTOLIC BLOOD PRESSURE: 138 MMHG | TEMPERATURE: 97.6 F | RESPIRATION RATE: 17 BRPM

## 2023-01-19 PROCEDURE — G0299 HHS/HOSPICE OF RN EA 15 MIN: HCPCS

## 2023-01-19 ASSESSMENT — ENCOUNTER SYMPTOMS
HEMOPTYSIS: 0
DYSPNEA ACTIVITY LEVEL: AFTER AMBULATING MORE THAN 20 FT

## 2023-01-24 ENCOUNTER — TELEPHONE (OUTPATIENT)
Dept: CARDIOLOGY CLINIC | Age: 88
End: 2023-01-24

## 2023-01-24 ENCOUNTER — HOME CARE VISIT (OUTPATIENT)
Dept: SCHEDULING | Facility: HOME HEALTH | Age: 88
End: 2023-01-24
Payer: MEDICARE

## 2023-01-24 VITALS
SYSTOLIC BLOOD PRESSURE: 168 MMHG | HEART RATE: 76 BPM | TEMPERATURE: 98.1 F | RESPIRATION RATE: 18 BRPM | OXYGEN SATURATION: 98 % | DIASTOLIC BLOOD PRESSURE: 76 MMHG

## 2023-01-24 PROCEDURE — G0299 HHS/HOSPICE OF RN EA 15 MIN: HCPCS

## 2023-01-24 ASSESSMENT — ENCOUNTER SYMPTOMS: HEMOPTYSIS: 0

## 2023-01-24 NOTE — TELEPHONE ENCOUNTER
Take p.m. dose early now, then starting tomorrow take both doses each morning, stay well-hydrated, check an afternoon resting blood pressure and call us back

## 2023-01-24 NOTE — TELEPHONE ENCOUNTER
Informed Bernardino Regan of Dr. Katie Hodges' response.  Jenna Sloan voiced understanding with verb recall.  Claudetta Sevin

## 2023-01-24 NOTE — TELEPHONE ENCOUNTER
Pt nurse called on behalf of pt regarding questions on pt's nifedipine RX. Original RX instructed to take 1POBID in AM&PM, but is reporting to feel dizzy and would like to know if the pt can take 2 in AM. Reported BP of 168/76 5 mins ago, HR 76, no CP or SOB.

## 2023-01-26 ENCOUNTER — HOME CARE VISIT (OUTPATIENT)
Dept: SCHEDULING | Facility: HOME HEALTH | Age: 88
End: 2023-01-26
Payer: MEDICARE

## 2023-01-26 VITALS
TEMPERATURE: 97.8 F | HEART RATE: 80 BPM | SYSTOLIC BLOOD PRESSURE: 140 MMHG | RESPIRATION RATE: 18 BRPM | DIASTOLIC BLOOD PRESSURE: 78 MMHG | OXYGEN SATURATION: 98 %

## 2023-01-26 PROCEDURE — G0299 HHS/HOSPICE OF RN EA 15 MIN: HCPCS

## 2023-01-26 ASSESSMENT — ENCOUNTER SYMPTOMS
STOOL DESCRIPTION: FORMED
HEMOPTYSIS: 0

## 2023-02-01 ENCOUNTER — HOME CARE VISIT (OUTPATIENT)
Dept: SCHEDULING | Facility: HOME HEALTH | Age: 88
End: 2023-02-01
Payer: MEDICARE

## 2023-02-08 ENCOUNTER — HOME CARE VISIT (OUTPATIENT)
Dept: SCHEDULING | Facility: HOME HEALTH | Age: 88
End: 2023-02-08
Payer: MEDICARE

## 2023-02-08 VITALS
RESPIRATION RATE: 18 BRPM | SYSTOLIC BLOOD PRESSURE: 136 MMHG | HEART RATE: 78 BPM | TEMPERATURE: 97.8 F | DIASTOLIC BLOOD PRESSURE: 78 MMHG | OXYGEN SATURATION: 97 %

## 2023-02-08 PROCEDURE — G0299 HHS/HOSPICE OF RN EA 15 MIN: HCPCS

## 2023-02-08 ASSESSMENT — ENCOUNTER SYMPTOMS
STOOL DESCRIPTION: FORMED
HEMOPTYSIS: 0

## 2023-03-02 ASSESSMENT — ENCOUNTER SYMPTOMS
ABDOMINAL PAIN: 0
ALLERGIC/IMMUNOLOGIC NEGATIVE: 1
BLURRED VISION: 0
SHORTNESS OF BREATH: 0
NAUSEA: 0
VOMITING: 0
GASTROINTESTINAL NEGATIVE: 1
RESPIRATORY NEGATIVE: 1
RHINORRHEA: 1
EYES NEGATIVE: 1

## 2023-03-06 ENCOUNTER — TELEPHONE (OUTPATIENT)
Dept: CARDIOLOGY CLINIC | Age: 88
End: 2023-03-06

## 2023-03-06 NOTE — TELEPHONE ENCOUNTER
Pt wants to know if if she should be taking 1 tablet a day or 2 tablets a day on RX Nifedipine 60 mg? Pt would aprreciate a call back.

## 2023-03-09 NOTE — TELEPHONE ENCOUNTER
Per Dr. Dao Allison patient and left voice mail to return call to [facility].   Take p.m. dose early now, then starting tomorrow take both doses each morning, stay well-hydrated, check an afternoon resting blood pressure and call us back

## 2023-03-13 ENCOUNTER — TELEPHONE (OUTPATIENT)
Dept: CARDIOLOGY CLINIC | Age: 88
End: 2023-03-13

## 2023-05-22 RX ORDER — NIFEDIPINE 60 MG/1
TABLET, EXTENDED RELEASE ORAL
Qty: 90 TABLET | Refills: 3 | Status: SHIPPED | OUTPATIENT
Start: 2023-05-22